# Patient Record
Sex: MALE | Race: WHITE | URBAN - METROPOLITAN AREA
[De-identification: names, ages, dates, MRNs, and addresses within clinical notes are randomized per-mention and may not be internally consistent; named-entity substitution may affect disease eponyms.]

---

## 2021-11-24 ENCOUNTER — EMERGENCY (EMERGENCY)
Facility: HOSPITAL | Age: 61
LOS: 1 days | End: 2021-11-24
Admitting: EMERGENCY MEDICINE
Payer: COMMERCIAL

## 2021-11-24 ENCOUNTER — INPATIENT (INPATIENT)
Facility: HOSPITAL | Age: 61
LOS: 2 days | Discharge: HOME | DRG: 175 | End: 2021-11-27
Attending: HOSPITALIST | Admitting: STUDENT IN AN ORGANIZED HEALTH CARE EDUCATION/TRAINING PROGRAM
Payer: COMMERCIAL

## 2021-11-24 VITALS
WEIGHT: 202.6 LBS | HEART RATE: 85 BPM | DIASTOLIC BLOOD PRESSURE: 83 MMHG | SYSTOLIC BLOOD PRESSURE: 128 MMHG | RESPIRATION RATE: 18 BRPM | OXYGEN SATURATION: 94 %

## 2021-11-24 DIAGNOSIS — I82.409 ACUTE EMBOLISM AND THROMBOSIS OF UNSPECIFIED DEEP VEINS OF UNSPECIFIED LOWER EXTREMITY: ICD-10-CM

## 2021-11-24 DIAGNOSIS — Z90.79 ACQUIRED ABSENCE OF OTHER GENITAL ORGAN(S): Chronic | ICD-10-CM

## 2021-11-24 DIAGNOSIS — Z90.79 ACQUIRED ABSENCE OF OTHER GENITAL ORGAN(S): ICD-10-CM

## 2021-11-24 DIAGNOSIS — I10 ESSENTIAL (PRIMARY) HYPERTENSION: ICD-10-CM

## 2021-11-24 DIAGNOSIS — I26.99 OTHER PULMONARY EMBOLISM WITHOUT ACUTE COR PULMONALE: ICD-10-CM

## 2021-11-24 LAB
ALBUMIN SERPL ELPH-MCNC: 3.3 G/DL — SIGNIFICANT CHANGE UP (ref 3.3–5.2)
ALP SERPL-CCNC: 82 U/L — SIGNIFICANT CHANGE UP (ref 40–120)
ALT FLD-CCNC: 16 U/L — SIGNIFICANT CHANGE UP
ANION GAP SERPL CALC-SCNC: 12 MMOL/L — SIGNIFICANT CHANGE UP (ref 5–17)
APTT BLD: 124.5 SEC — CRITICAL HIGH (ref 27.5–35.5)
AST SERPL-CCNC: 17 U/L — SIGNIFICANT CHANGE UP
BASOPHILS # BLD AUTO: 0.12 K/UL — SIGNIFICANT CHANGE UP (ref 0–0.2)
BASOPHILS NFR BLD AUTO: 1 % — SIGNIFICANT CHANGE UP (ref 0–2)
BILIRUB SERPL-MCNC: 0.8 MG/DL — SIGNIFICANT CHANGE UP (ref 0.4–2)
BLD GP AB SCN SERPL QL: SIGNIFICANT CHANGE UP
BUN SERPL-MCNC: 17.7 MG/DL — SIGNIFICANT CHANGE UP (ref 8–20)
CALCIUM SERPL-MCNC: 8.2 MG/DL — LOW (ref 8.6–10.2)
CHLORIDE SERPL-SCNC: 101 MMOL/L — SIGNIFICANT CHANGE UP (ref 98–107)
CO2 SERPL-SCNC: 23 MMOL/L — SIGNIFICANT CHANGE UP (ref 22–29)
CREAT SERPL-MCNC: 0.88 MG/DL — SIGNIFICANT CHANGE UP (ref 0.5–1.3)
EOSINOPHIL # BLD AUTO: 0.25 K/UL — SIGNIFICANT CHANGE UP (ref 0–0.5)
EOSINOPHIL NFR BLD AUTO: 2 % — SIGNIFICANT CHANGE UP (ref 0–6)
GLUCOSE SERPL-MCNC: 103 MG/DL — HIGH (ref 70–99)
HCT VFR BLD CALC: 32.4 % — LOW (ref 39–50)
HGB BLD-MCNC: 11 G/DL — LOW (ref 13–17)
IMM GRANULOCYTES NFR BLD AUTO: 0.2 % — SIGNIFICANT CHANGE UP (ref 0–1.5)
INR BLD: 1.38 RATIO — HIGH (ref 0.88–1.16)
LYMPHOCYTES # BLD AUTO: 1.87 K/UL — SIGNIFICANT CHANGE UP (ref 1–3.3)
LYMPHOCYTES # BLD AUTO: 14.9 % — SIGNIFICANT CHANGE UP (ref 13–44)
MCHC RBC-ENTMCNC: 30.2 PG — SIGNIFICANT CHANGE UP (ref 27–34)
MCHC RBC-ENTMCNC: 34 GM/DL — SIGNIFICANT CHANGE UP (ref 32–36)
MCV RBC AUTO: 89 FL — SIGNIFICANT CHANGE UP (ref 80–100)
MONOCYTES # BLD AUTO: 1.43 K/UL — HIGH (ref 0–0.9)
MONOCYTES NFR BLD AUTO: 11.4 % — SIGNIFICANT CHANGE UP (ref 2–14)
NEUTROPHILS # BLD AUTO: 8.84 K/UL — HIGH (ref 1.8–7.4)
NEUTROPHILS NFR BLD AUTO: 70.5 % — SIGNIFICANT CHANGE UP (ref 43–77)
NT-PROBNP SERPL-SCNC: 450 PG/ML — HIGH (ref 0–300)
PLATELET # BLD AUTO: 255 K/UL — SIGNIFICANT CHANGE UP (ref 150–400)
POTASSIUM SERPL-MCNC: 3.8 MMOL/L — SIGNIFICANT CHANGE UP (ref 3.5–5.3)
POTASSIUM SERPL-SCNC: 3.8 MMOL/L — SIGNIFICANT CHANGE UP (ref 3.5–5.3)
PROT SERPL-MCNC: 6.6 G/DL — SIGNIFICANT CHANGE UP (ref 6.6–8.7)
PROTHROM AB SERPL-ACNC: 15.8 SEC — HIGH (ref 10.6–13.6)
RBC # BLD: 3.64 M/UL — LOW (ref 4.2–5.8)
RBC # FLD: 13.1 % — SIGNIFICANT CHANGE UP (ref 10.3–14.5)
SODIUM SERPL-SCNC: 136 MMOL/L — SIGNIFICANT CHANGE UP (ref 135–145)
TROPONIN T SERPL-MCNC: 0.02 NG/ML — SIGNIFICANT CHANGE UP (ref 0–0.06)
WBC # BLD: 12.54 K/UL — HIGH (ref 3.8–10.5)
WBC # FLD AUTO: 12.54 K/UL — HIGH (ref 3.8–10.5)

## 2021-11-24 PROCEDURE — 93010 ELECTROCARDIOGRAM REPORT: CPT

## 2021-11-24 PROCEDURE — 99223 1ST HOSP IP/OBS HIGH 75: CPT

## 2021-11-24 PROCEDURE — 73562 X-RAY EXAM OF KNEE 3: CPT | Mod: 26,LT

## 2021-11-24 PROCEDURE — 99285 EMERGENCY DEPT VISIT HI MDM: CPT | Mod: 25

## 2021-11-24 PROCEDURE — 93971 EXTREMITY STUDY: CPT | Mod: 26,LT

## 2021-11-24 PROCEDURE — 99285 EMERGENCY DEPT VISIT HI MDM: CPT

## 2021-11-24 PROCEDURE — 71046 X-RAY EXAM CHEST 2 VIEWS: CPT | Mod: 26

## 2021-11-24 PROCEDURE — 93306 TTE W/DOPPLER COMPLETE: CPT | Mod: 26

## 2021-11-24 PROCEDURE — 71275 CT ANGIOGRAPHY CHEST: CPT | Mod: 26

## 2021-11-24 RX ORDER — HEPARIN SODIUM 5000 [USP'U]/ML
3500 INJECTION INTRAVENOUS; SUBCUTANEOUS EVERY 6 HOURS
Refills: 0 | Status: DISCONTINUED | OUTPATIENT
Start: 2021-11-24 | End: 2021-11-26

## 2021-11-24 RX ORDER — ONDANSETRON 8 MG/1
4 TABLET, FILM COATED ORAL EVERY 8 HOURS
Refills: 0 | Status: DISCONTINUED | OUTPATIENT
Start: 2021-11-24 | End: 2021-11-27

## 2021-11-24 RX ORDER — MORPHINE SULFATE 50 MG/1
4 CAPSULE, EXTENDED RELEASE ORAL ONCE
Refills: 0 | Status: DISCONTINUED | OUTPATIENT
Start: 2021-11-24 | End: 2021-11-24

## 2021-11-24 RX ORDER — SODIUM CHLORIDE 9 MG/ML
3 INJECTION INTRAMUSCULAR; INTRAVENOUS; SUBCUTANEOUS EVERY 8 HOURS
Refills: 0 | Status: DISCONTINUED | OUTPATIENT
Start: 2021-11-24 | End: 2021-11-27

## 2021-11-24 RX ORDER — ACETAMINOPHEN 500 MG
650 TABLET ORAL EVERY 6 HOURS
Refills: 0 | Status: DISCONTINUED | OUTPATIENT
Start: 2021-11-24 | End: 2021-11-27

## 2021-11-24 RX ORDER — MORPHINE SULFATE 50 MG/1
4 CAPSULE, EXTENDED RELEASE ORAL EVERY 4 HOURS
Refills: 0 | Status: DISCONTINUED | OUTPATIENT
Start: 2021-11-24 | End: 2021-11-25

## 2021-11-24 RX ORDER — HEPARIN SODIUM 5000 [USP'U]/ML
INJECTION INTRAVENOUS; SUBCUTANEOUS
Qty: 25000 | Refills: 0 | Status: DISCONTINUED | OUTPATIENT
Start: 2021-11-24 | End: 2021-11-26

## 2021-11-24 RX ORDER — HEPARIN SODIUM 5000 [USP'U]/ML
7500 INJECTION INTRAVENOUS; SUBCUTANEOUS EVERY 6 HOURS
Refills: 0 | Status: DISCONTINUED | OUTPATIENT
Start: 2021-11-24 | End: 2021-11-26

## 2021-11-24 RX ORDER — LANOLIN ALCOHOL/MO/W.PET/CERES
3 CREAM (GRAM) TOPICAL AT BEDTIME
Refills: 0 | Status: DISCONTINUED | OUTPATIENT
Start: 2021-11-24 | End: 2021-11-27

## 2021-11-24 RX ORDER — PANTOPRAZOLE SODIUM 20 MG/1
40 TABLET, DELAYED RELEASE ORAL
Refills: 0 | Status: DISCONTINUED | OUTPATIENT
Start: 2021-11-24 | End: 2021-11-27

## 2021-11-24 RX ADMIN — MORPHINE SULFATE 4 MILLIGRAM(S): 50 CAPSULE, EXTENDED RELEASE ORAL at 23:29

## 2021-11-24 RX ADMIN — MORPHINE SULFATE 4 MILLIGRAM(S): 50 CAPSULE, EXTENDED RELEASE ORAL at 22:21

## 2021-11-24 RX ADMIN — HEPARIN SODIUM 1500 UNIT(S)/HR: 5000 INJECTION INTRAVENOUS; SUBCUTANEOUS at 20:07

## 2021-11-24 RX ADMIN — SODIUM CHLORIDE 3 MILLILITER(S): 9 INJECTION INTRAMUSCULAR; INTRAVENOUS; SUBCUTANEOUS at 23:47

## 2021-11-24 RX ADMIN — HEPARIN SODIUM 1700 UNIT(S)/HR: 5000 INJECTION INTRAVENOUS; SUBCUTANEOUS at 19:00

## 2021-11-24 NOTE — CONSULT NOTE ADULT - PROBLEM SELECTOR RECOMMENDATION 9
Admit to Stepdown unit for close monitoring, check CBC, CMP, trend Trops, Coags, ECG  - patient on 3L NC w/ target sats > 92% no acute chest pain or dyspnea noted at rest  - hemodynamically stable w/ normotensive BP, normal right ventricular size and function and normal biomarkers  - empiric anticoagulation w/ Heparin gtt and keep PTT 60-90sec for large clot burden  - monitor for acute bleeding in light of recent prostate surgery, Galdamez cath in place no overt hematuria noted   - Stat ECHO done in [facility]  -  Thrombolysis and/or catheter-based therapies may be avoided at this time, risk of hemorrhage outweighs benefits due to recent surgery  - ICU consult requested by [facility]  - patient will require Covid Ab test, Hematology evaluation for fulminant malignancy  - bridging w/ DOACs as per primary team upon discharge

## 2021-11-24 NOTE — ED PROVIDER NOTE - NS ED ATTENDING STATEMENT MOD
[Person] : oriented to person [Place] : oriented to place [Time] : oriented to time [Cranial Nerves Optic (II)] : visual acuity intact bilaterally,  visual fields full to confrontation, pupils equal round and reactive to light [Cranial Nerves Oculomotor (III)] : extraocular motion intact [Cranial Nerves Trigeminal (V)] : facial sensation intact symmetrically [Cranial Nerves Facial (VII)] : face symmetrical [Cranial Nerves Vestibulocochlear (VIII)] : hearing was intact bilaterally [Cranial Nerves Glossopharyngeal (IX)] : tongue and palate midline [Cranial Nerves Accessory (XI - Cranial And Spinal)] : head turning and shoulder shrug symmetric [Cranial Nerves Hypoglossal (XII)] : there was no tongue deviation with protrusion [Toe-Walking] : normal toe-walking [Heel Walking] : normal heel walking [Tandem Walking] : normal tandem walking [Normal] : patient has a normal gait including toe-walking, heel-walking and tandem walking. Romberg sign is negative. [de-identified] : Fundi examination sharp margins bilaterally, no signs of papilledema [de-identified] : Patient had multiple episodes of blinking throughout the exam.  Attending with

## 2021-11-24 NOTE — ED ADULT NURSE NOTE - ED STAT RN HANDOFF DETAILS
Report given to EDWINA King holding room   Pt transported to bed CDU 4R, placed on CM and .  Oxygen 2lpm via NC.

## 2021-11-24 NOTE — CONSULT NOTE ADULT - PROBLEM SELECTOR RECOMMENDATION 3
Patient w/ indwelling Galdamez Cath, monitor urine output  - follow closely for acute hemorrhage on heparin  - pain management    case d/w Dr. Allen

## 2021-11-24 NOTE — ED PROVIDER NOTE - CLINICAL SUMMARY MEDICAL DECISION MAKING FREE TEXT BOX
60yo M w/pmh DVT, HTN, GERD presents as transfer from [facility] for DVT and PE. Heparin started at [facility], continued in ER. EKG, labs pending. 62yo M w/pmh DVT, HTN, GERD presents as transfer from [place] for DVT and PE. Heparin started at [facility], continued in ER. EKG, labs show elevated PTT, will adjust heparin per protocol. COVID negative at [facility].

## 2021-11-24 NOTE — ED PROVIDER NOTE - NS ED ROS FT
Constitutional: no fever, no sweats, and no chills.  CV: no chest pain, no edema.  Resp: no cough, +dyspnea on exertion  GI: no abdominal pain, no nausea and no vomiting.  MSK: +msk pain, no weakness  Skin: no lesions, and no rashes.  Neuro: no LOC, no headache, no sensory deficits, and no dizziness  ROS otherwise negative except as noted in HPI.

## 2021-11-24 NOTE — H&P ADULT - ASSESSMENT
62 y/o male with B/L PE, LLE DVT, s/p recent radical prostatectomy for prostate cancer, Hx of HTN, GERD    PE/DVT:  -Likely provoked from recent surgery and underlying malignancy   -RV strain on CT, currently normotensive, not hypoxic, trop neg. minor elevation in BNP, EKG as above  -Hold Norvasc for now due to risk of hypotension and risk of bleeding on heparin with recent surgery  -Cont. Heparin nomogram, monitor for hematuria, drop in Hbg.  -F/U on 2Decho, EKOS eval in AM with Cardiology  -Patient is high risk for bleeding in post operative bed with TPA.  -Eventual transition to NOAC prior to DC   -OOB, ambulate  -COVID PCR neg at [facility] today    Prostatectomy:  -Due for moreno removal on Friday, will need to reach out to [facility] team prior DC of moreno  -All incisions clean/intact  -F/U with MSK team post DC  -PRN pain regimen    HTN:  -DASH diet  -Resume Norvasc if no bleeding/remains normotensive    GERD:  -PPI    Discussed plan with Patient, ED staff.

## 2021-11-24 NOTE — ED ADULT TRIAGE NOTE - CHIEF COMPLAINT QUOTE
Pt BIBA from [facility], transferred for + DVT to RLE and Pulmonary emboli s/p prostatectomy 1 week ago. Pt arrived on Heparin gtt at 1650 units / hour. Per paperwork from [facility], last PTT completed at 1444. Pt brought directly to critical care room. MD called to bedside for eval.

## 2021-11-24 NOTE — H&P ADULT - HISTORY OF PRESENT ILLNESS
60 y/o male sent from [facility] after being diagnosed there with B/L PE with high clot burden and LLE DVT. Patient is s/p radical prostatectomy with lymph node dissection at [facility] last week for prostate cancer. He was dcd home with Moreno and was supposed to f/u this Friday with moreno removal. He states he has been fairly mobile at home, eating and with not much pain. He went to [facility] today as instructed by his Doctor who he called at [facility] after he noted SOB and cough which was preceded by left calf pain the day prior.  He does admit to a superficial thrombus 3 years ago after arthroscopic surgery to remove bone fragments in his left knee. Denies any other history of VTE. Denies fever, chills, N/V, CP. No bleeding reported in catheter and abdominal/pelvic incisions clean and intact. In the ED patient with no RV strain on EKG, neg trop, not hypoxic, and normotensive. Started on Heparin drip at [facility] and currently infusing.

## 2021-11-24 NOTE — ED PROVIDER NOTE - PROGRESS NOTE DETAILS
Resident Sofi Hamilton: echo performed, spoke to cardiology, they said they will post the read shortly. Patient remains stable. PTT elevated, heparin held per protocol, will recheck. Resident Sofi Hamilton: spoke to cardiology, plan for stat TTE to evaluate right heart strain seen on CT at [place]. Consulted MICU. Resident Sofi Hamilton: spoke to MICU, not a candidate for ICU at this time. Patient remains stable. Admitting to medicine.

## 2021-11-24 NOTE — CONSULT NOTE ADULT - SUBJECTIVE AND OBJECTIVE BOX
[facility]                                                        Office: [address] [address]                                                       Telephone: [phone]. Fax:[phone]                                                              CARDIOLOGY CONSULTATION NOTE                                                                                                 History obtained by: Patient and medical record     obtained: No    Chief complaint:       HPI: Patient is a 62yo M         REVIEW OF SYMPTOMS: Cardiovascular:  See HPI. No chest pain,  No dyspnea,  No syncope,  No palpitations, No dizziness, No Orthopnea,      No Paroxsymal nocturnal dyspnea;  Respiratory:  No Dyspnea, No cough,     Genitourinary:  No dysuria, no hematuria; Gastrointestinal:  No nausea, no vomiting. No diarrhea.  No abdominal pain. No dark color stool, no melena ; Neurological: No headache, no dizziness, no slurred speech;  Psychiatric: No agitation, no anxiety.  ALL OTHER REVIEW OF SYSTEMS ARE NEGATIVE.    ALLERGIES: No Known Allergies    CURRENT MEDICATIONS:  morphine  - Injectable  heparin  Infusion.    HOME MEDICATIONS:  Amlodipine 10mg oral daily    PAST MEDICAL HISTORY  Prostate cancer  GERD (gastroesophageal reflux disease)  Hypertension    PAST SURGICAL HISTORY  H/O prostatectomy    FAMILY HISTORY: CAD and Prostate CA    SOCIAL HISTORY:  Denies smoking/alcohol/drugs    Vital Signs Last 24 Hrs  T(C): 37.2 (24 Nov 2021 19:05), Max: 37.2 (24 Nov 2021 19:05)  T(F): 99 (24 Nov 2021 19:05), Max: 99 (24 Nov 2021 19:05)  HR: 94 (24 Nov 2021 19:17) (79 - 94)  BP: 128/83 (24 Nov 2021 19:17) (117/85 - 128/83)  BP(mean): --  RR: 20 (24 Nov 2021 19:17) (18 - 20)  SpO2: 97% (24 Nov 2021 19:17) (94% - 100%)    PHYSICAL EXAM:  Constitutional: Comfortable . No acute distress.   HEENT: Atraumatic and normcephalic , neck is supple . no JVD. No carotid bruit. PEERL   CNS: A&Ox3. No focal deficits. EOMI. Cranial nerves II-IX are intact.   Lymph Nodes: Cervical : Not palpable.  Respiratory: CTAB  Cardiovascular: S1S2 RRR. No murmur/rubs or gallop.  Gastrointestinal: Soft non-tender and non distended . +Bowel sounds. negative Serrano's sign.  Extremities: No edema.   Psychiatric: Calm . no agitation.  Skin: No skin rash/ulcers visualized to face, hands or feet.    Intake and output:     LABS:                        11.0   12.54 )-----------( 255      ( 24 Nov 2021 19:26 )             32.4     11-24    136  |  101  |  17.7  ----------------------------<  103<H>  3.8   |  23.0  |  0.88    Ca    8.2<L>      24 Nov 2021 19:26    TPro  6.6  /  Alb  3.3  /  TBili  0.8  /  DBili  x   /  AST  17  /  ALT  16  /  AlkPhos  82  11-24      ;p-BNP=  PT/INR - ( 24 Nov 2021 19:26 )   PT: 15.8 sec;   INR: 1.38 ratio         PTT - ( 24 Nov 2021 19:26 )  PTT:124.5 sec      INTERPRETATION OF TELEMETRY: Reviewed by me.   ECG: Reviewed by me.     RADIOLOGY & ADDITIONAL STUDIES:    X-ray:  reviewed by me.   CT scan:   MRI:   ECHO FINDINGS: Date:                : LVEF=          ; RV function:       ; Valvular abnormalities: No significant valvular abnormality.  Mitral valve:           ;  Aortic valve:              ;Tricuspid valve:         ; Pulmonary pressures:        mm Hg. Pericardium:      STRESS  FINDINGS: Date:            ;      CATHETERIZATION FINDINGS:  Date:              :  LAD:                       ;  LCx:                        ; RCA:                ;                                                                        [facility]                                                       [facility]                                                        Office: [address], [place] [address]                                                       Telephone: [phone]. Fax:[phone]                                                              CARDIOLOGY CONSULTATION NOTE                                                                                                 History obtained by: Patient and medical record     obtained: No    Chief complaint:   I developed Left knee pain and then was short of breath today    HPI: Patient is a 60yo M w/ PMHx of hypertension, hyperlipidemia, prostate cancer, s/p  radical prostatectomy this past Friday at [facility] in [place] and indwelling Galdamez, transferred from [facility] after patient presented w/ complaints of left lower extremity pain and shortness of breath with cough.  Patient states that yesterday afternoon he developed intermittent LLE calf pain, radiation to back of knee.  Pain was initially dull then turned into sharp pain and became worse.  Patient tried to walk it off, but overnight he couldn't rest and this morning developed dyspnea and cough.  Called [facility] and staff over there told him to go to closest ER for evaluation.  At [facility] patient underwent LLE US that confirmed acute Left peroneal vein thrombosis and complex popliteal cyst.  and subsequent CT Angio showed B/L PEs w/ possible right heart strain.  Patient transported to [facility] via ambulance.  Currently still with LLE pain and mild dyspnea.  Denies trauma, sick contacts, fever, chills, weakness, headache, recent travel, change in vision,  chest pain, abdominal pain, nausea, vomiting, or change in bowel movements.  Patient did not get any Covid vaccine.     REVIEW OF SYMPTOMS:   Cardiovascular: See HPI. No chest pain, + dyspnea, No syncope, no pleuritic chest pain, no palpitations, no dizziness, no Orthopnea, no Paroxsymal nocturnal dyspnea;    Respiratory: + Dyspnea, +cough,     Genitourinary:  + Galdamez cath, No dysuria, no hematuria;   Gastrointestinal: No nausea, no vomiting. No diarrhea. +mild post surgical abdominal pain, no dark color stool, no melena;   Neurological: No headache, no dizziness, no slurred speech;    Psychiatric: No agitation, no anxiety.  ALL OTHER REVIEW OF SYSTEMS ARE NEGATIVE.    ALLERGIES: No Known Allergies    CURRENT MEDICATIONS:  morphine  - Injectable  heparin  Infusion.    HOME MEDICATIONS:  Amlodipine 10mg oral daily    PAST MEDICAL HISTORY  Prostate cancer  GERD (gastroesophageal reflux disease)  Hypertension    PAST SURGICAL HISTORY  H/O prostatectomy    FAMILY HISTORY: CAD and Prostate CA    SOCIAL HISTORY:  Denies smoking/alcohol/drugs    Vital Signs Last 24 Hrs  T(C): 37.2 (24 Nov 2021 19:05), Max: 37.2 (24 Nov 2021 19:05)  T(F): 99 (24 Nov 2021 19:05), Max: 99 (24 Nov 2021 19:05)  HR: 94 (24 Nov 2021 19:17) (79 - 94)  BP: 128/83 (24 Nov 2021 19:17) (117/85 - 128/83)  BP(mean): --  RR: 20 (24 Nov 2021 19:17) (18 - 20)  SpO2: 97% (24 Nov 2021 19:17) (94% - 100%)    PHYSICAL EXAM:  Constitutional: Comfortable mild resp. distress on 3L NC   HEENT: Atraumatic, EOMi, neck is supple, no JVD, No carotid bruit   CNS: A&Ox3. No focal motor deficits, sensory intact   Respiratory: CTA no wheezing or rales,   Cardiovascular: S1S2 RRR. No murmur/rubs  Gastrointestinal: Soft, + tenderness in lower Qs, non distended +Bowel sounds  Extremities: + LLE calf tenderness to fine touch, mild non pitting edema    Psychiatric: Calm, no agitation  Skin: warm to touch, no skin rash/ulcers visualized to face, hands or feet    Intake and output:     LABS:                        11.0   12.54 )-----------( 255      ( 24 Nov 2021 19:26 )             32.4     11-24    136  |  101  |  17.7  ----------------------------<  103<H>  3.8   |  23.0  |  0.88    Ca    8.2<L>      24 Nov 2021 19:26    TPro  6.6  /  Alb  3.3  /  TBili  0.8  /  DBili  x   /  AST  17  /  ALT  16  /  AlkPhos  82  11-24    PT: 15.8 sec;   INR: 1.38 ratio   PTT:124.5 sec    INTERPRETATION OF TELEMETRY: Sinus no ectopy  ECG: NSR@ 88bpm, no acute T or ST changes     RADIOLOGY & ADDITIONAL STUDIES:   [facility] CT Angio scan:  IMPRESSION: Acute bilateral pulmonary emboli ,with a high clot burden, evidence of right ventricular strain. No pulmonary infarct.    Right LE US:  IMPRESSION: Left peroneal vein thrombosis. Complex popliteal cyst. Acute deep venous thrombosis: below the knee.    ECHO FINDINGS: Date: Pen                                                                       [facility]                                                       [facility]                                                        Office: [address], [place] [address]                                                       Telephone: [phone]. Fax:[phone]                                                              CARDIOLOGY CONSULTATION NOTE                                                                                                 History obtained by: Patient and medical record     obtained: No    Chief complaint:   I developed Left knee pain and then was short of breath today    HPI: Patient is a 60yo M w/ PMHx of hypertension, hyperlipidemia, prostate cancer, s/p  radical prostatectomy this past Friday at [facility] in [place] and indwelling Galdamez, transferred from [facility] after patient presented w/ complaints of left lower extremity pain and shortness of breath with cough.  Patient states that yesterday afternoon he developed intermittent LLE calf pain, radiation to back of knee.  Pain was initially dull then turned into sharp pain and became worse.  Patient tried to walk it off, but overnight he couldn't rest and this morning developed dyspnea and cough.  Called [facility] and staff over there told him to go to closest ER for evaluation.  At [facility] patient underwent LLE US that confirmed acute Left peroneal vein thrombosis and complex popliteal cyst.  and subsequent CT Angio showed B/L PEs w/ possible right heart strain.  Patient transported to [facility] via ambulance.  Currently still with LLE pain and mild dyspnea.  Denies trauma, sick contacts, fever, chills, weakness, headache, recent travel, change in vision,  chest pain, abdominal pain, nausea, vomiting, or change in bowel movements.  Patient did not get any Covid vaccine.     REVIEW OF SYMPTOMS:   Cardiovascular: See HPI. No chest pain, + dyspnea, No syncope, no pleuritic chest pain, no palpitations, no dizziness, no Orthopnea, no Paroxsymal nocturnal dyspnea;    Respiratory: + Dyspnea, +cough,     Genitourinary:  + Galdamez cath, No dysuria, no hematuria;   Gastrointestinal: No nausea, no vomiting. No diarrhea. +mild post surgical abdominal pain, no dark color stool, no melena;   Neurological: No headache, no dizziness, no slurred speech;    Psychiatric: No agitation, no anxiety.  ALL OTHER REVIEW OF SYSTEMS ARE NEGATIVE.    ALLERGIES: No Known Allergies    CURRENT MEDICATIONS:  morphine  - Injectable  heparin  Infusion.    HOME MEDICATIONS:  Amlodipine 10mg oral daily    PAST MEDICAL HISTORY  Prostate cancer  GERD (gastroesophageal reflux disease)  Hypertension    PAST SURGICAL HISTORY  H/O prostatectomy    FAMILY HISTORY: CAD and Prostate CA    SOCIAL HISTORY:  Denies smoking/alcohol/drugs    Vital Signs Last 24 Hrs  T(C): 37.2 (24 Nov 2021 19:05), Max: 37.2 (24 Nov 2021 19:05)  T(F): 99 (24 Nov 2021 19:05), Max: 99 (24 Nov 2021 19:05)  HR: 94 (24 Nov 2021 19:17) (79 - 94)  BP: 128/83 (24 Nov 2021 19:17) (117/85 - 128/83)  BP(mean): --  RR: 20 (24 Nov 2021 19:17) (18 - 20)  SpO2: 97% (24 Nov 2021 19:17) (94% - 100%)    PHYSICAL EXAM:  Constitutional: Comfortable mild resp. distress on 3L NC   HEENT: Atraumatic, EOMi, neck is supple, no JVD, No carotid bruit   CNS: A&Ox3. No focal motor deficits, sensory intact   Respiratory: CTA no wheezing or rales,   Cardiovascular: S1S2 RRR. No murmur/rubs  Gastrointestinal: Soft, + tenderness in lower Qs, non distended +Bowel sounds  Extremities: + LLE calf tenderness to fine touch, mild non pitting edema    Psychiatric: Calm, no agitation  Skin: warm to touch, no skin rash/ulcers visualized to face, hands or feet    Intake and output:     LABS:                        11.0   12.54 )-----------( 255      ( 24 Nov 2021 19:26 )             32.4     11-24    136  |  101  |  17.7  ----------------------------<  103<H>  3.8   |  23.0  |  0.88    Ca    8.2<L>      24 Nov 2021 19:26    TPro  6.6  /  Alb  3.3  /  TBili  0.8  /  DBili  x   /  AST  17  /  ALT  16  /  AlkPhos  82  11-24    PT: 15.8 sec;   INR: 1.38 ratio   PTT:124.5 sec    INTERPRETATION OF TELEMETRY: Sinus no ectopy  ECG: NSR@ 88bpm, no acute T or ST changes     RADIOLOGY & ADDITIONAL STUDIES:   [facility] CT Angio scan:  IMPRESSION: Acute bilateral pulmonary emboli ,with a high clot burden, evidence of right ventricular strain. No pulmonary infarct.    Right LE US:  IMPRESSION: Left peroneal vein thrombosis. Complex popliteal cyst. Acute deep venous thrombosis: below the knee.    ECHO FINDINGS: Date: Summary:   1. Left ventricular ejection fraction, by visual estimation, is 70 to 75%.   2. Hyperdynamic global left ventricular systolic function.   3. There is mild concentric left ventricular hypertrophy.   4. Mildly enlarged right ventricle.   5. Moderately reduced RV systolic function.   6. Mild mitral valve regurgitation.   7. Mild tricuspid regurgitation.   8. Estimated pulmonary artery systolic pressure is 46.0 mmHg assuming a right atrial pressure of 3 mmHg, which is consistent with mild pulmonary hypertension.   9. Dilatation of the aortic root and sinuses of Valsalva.  10. Results d/w the ordering provider.    MD Nikki Electronically signed on 11/24/2021 at 11:34:56 PM

## 2021-11-24 NOTE — ED ADULT NURSE NOTE - NSICDXPASTMEDICALHX_GEN_ALL_CORE_FT
PAST MEDICAL HISTORY:  GERD (gastroesophageal reflux disease)     Hypertension     Prostate cancer

## 2021-11-24 NOTE — CONSULT NOTE ADULT - PROBLEM SELECTOR RECOMMENDATION 2
Low na diet, monitor on tele, optimal BP goals  - resume Norvasc 10mg oral daily  - PT evaluation and OOB as taurus Low na diet, monitor on tele, optimal BP goals  - hold Norvasc for now  - PT evaluation and OOB as taurus

## 2021-11-24 NOTE — ED PROVIDER NOTE - OBJECTIVE STATEMENT
60yo M w/pmh DVT, HTN, GERD presents as transfer from [place] for DVT and PE. Reports L calf pain since yesterday, CRAIN today. Went to [facility] [place] for evaluation, found to have bilateral PE w/ evidence of R heart strain, LLE DVT. Started on heparin, transferred. Not currently SOB. Denies lightheadedness, CP. 60yo M w/pmh DVT, HTN, GERD, prostectomy 5d ago presents as transfer from [place] for DVT and PE. Reports L calf pain since yesterday, CRAIN today. Went to [place] for evaluation, found to have bilateral PE w/ evidence of R heart strain, LLE DVT. Started on heparin, transferred. Not currently SOB. Denies lightheadedness, CP.

## 2021-11-24 NOTE — ED PROVIDER NOTE - ATTENDING CONTRIBUTION TO CARE
60yo M w/pmh DVT, HTN, GERD presents as transfer from [facility] for DVT and PE. Heparin started at [facility], continued in ER. EKG, labs show elevated PTT, will adjust heparin per protocol. COVID negative at [facility].

## 2021-11-24 NOTE — CONSULT NOTE ADULT - SUBJECTIVE AND OBJECTIVE BOX
Patient is a 61y old  Male who presents with a chief complaint of     HPI:  61M w/ PMHx HTN, prostate CA s/p recent radical prostatectomy (11/09) at [facility] w/ indwelling moreno was sent over form [facility] after being diagnosed to have a PE. Pt started experiencing worsening LLE pain yesterday in addition to exertion SOB and fatigue. Found to have a LLE acute DVT and CTA w/ B/L PEs w/ extensive clot burden. Sent to [facility] for further management.   On ICU eval he was asymptomatic and hemodynamically stable. Moreno w/ clear urine. On hep gtt     PAST MEDICAL & SURGICAL HISTORY:  Prostate cancer    GERD (gastroesophageal reflux disease)    Hypertension    H/O prostatectomy      Review of Systems:  CONSTITUTIONAL: No fever, chills, or fatigue  EYES: No eye pain, visual disturbances, or discharge  ENMT:  No difficulty hearing, tinnitus, vertigo; No sinus or throat pain  NECK: No pain or stiffness  RESPIRATORY: No cough, wheezing, chills or hemoptysis; No shortness of breath  CARDIOVASCULAR: No chest pain, palpitations, dizziness, or leg swelling  GASTROINTESTINAL: No abdominal or epigastric pain. No nausea, vomiting, or hematemesis; No diarrhea or constipation. No melena or hematochezia.  GENITOURINARY: No dysuria, frequency, hematuria, or incontinence  NEUROLOGICAL: No headaches, memory loss, loss of strength, numbness, or tremors  SKIN: No itching, burning, rashes, or lesions   MUSCULOSKELETAL: No joint pain or swelling; No muscle, back. +ve for extremity pain  PSYCHIATRIC: No depression, anxiety, mood swings, or difficulty sleeping      Physical Examination:    ICU Vital Signs Last 24 Hrs  T(C): 37.2 (24 Nov 2021 19:05), Max: 37.2 (24 Nov 2021 19:05)  T(F): 99 (24 Nov 2021 19:05), Max: 99 (24 Nov 2021 19:05)  HR: 86 (24 Nov 2021 22:21) (79 - 94)  BP: 137/79 (24 Nov 2021 22:21) (117/85 - 137/79)  BP(mean): --  ABP: --  ABP(mean): --  RR: 20 (24 Nov 2021 22:21) (18 - 20)  SpO2: 98% (24 Nov 2021 22:21) (94% - 100%)      General: No acute distress.    Neuro: AAO*3, No focal deficits   HEENT: Pupils equal, reactive to light  PULM: Clear to auscultation bilaterally  CVS: Regular rhythm and controlled rate  ABD: Soft, nondistended, nontender  EXT: No b/l LE edema  SKIN: Warm and well perfused, no acute rashes       Medications:        acetaminophen     Tablet .. 650 milliGRAM(s) Oral every 6 hours PRN  melatonin 3 milliGRAM(s) Oral at bedtime PRN  ondansetron Injectable 4 milliGRAM(s) IV Push every 8 hours PRN      heparin   Injectable 7500 Unit(s) IV Push every 6 hours PRN  heparin   Injectable 3500 Unit(s) IV Push every 6 hours PRN  heparin  Infusion.  Unit(s)/Hr IV Continuous <Continuous>    aluminum hydroxide/magnesium hydroxide/simethicone Suspension 30 milliLiter(s) Oral every 4 hours PRN        sodium chloride 0.9% lock flush 3 milliLiter(s) IV Push every 8 hours                I&O's Detail        RADIOLOGY/ Microbiology/ Labs: reviewed

## 2021-11-24 NOTE — CONSULT NOTE ADULT - ATTENDING COMMENTS
Patient with DVT and Pulmonary embolism. Patient c/o left knee pain and swelling.  < from: TTE Echo Complete w/o Contrast w/ Doppler (11.24.21 @ 20:12) >    Summary:   1. Left ventricular ejection fraction, by visual estimation, is 70 to 75%.   2. Hyperdynamic global left ventricular systolic function.   3. There is mild concentric left ventricular hypertrophy.   4. Mildly enlarged right ventricle.   5. Moderately reduced RV systolic function.   6. Mild mitral valve regurgitation.   7. Mild tricuspid regurgitation.   8. Estimated pulmonary artery systolic pressure is 46.0 mmHg assuming a right atrial pressure of 3 mmHg, which is consistent with mild pulmonary hypertension.   9. Dilatation of the aortic root and sinuses of Valsalva.  10. Results d/w the ordering provider.    MD Nikki Electronically signed on 11/24/2021 at 11:34:56 PM        Patient is on  IV Heparin for PE.  Patients echo shows RV Strain.    Patient to be evaluated by PERT team in AM for possible intervention for PE.

## 2021-11-24 NOTE — ED ADULT NURSE NOTE - OBJECTIVE STATEMENT
assumed pt care as critical care RN.  Transferred from [facility] for PE.  Pt had prostectomy less than a week ago.  Hx of DVT post knee surgery in 2019.  Symptoms began with left calf pain.  today he felt short of breath after walking up the stairs. No respiratory distress upon arrival.  Heparin gtt infusing well.  Moreno catheter in place draining to leg bag. Clear yellow urine in bag.  Changed to moreno bag. Awaiting new  heparin orders from ER MD. Pt weighed upon arrival.

## 2021-11-25 DIAGNOSIS — M25.462 EFFUSION, LEFT KNEE: ICD-10-CM

## 2021-11-25 DIAGNOSIS — I26.99 OTHER PULMONARY EMBOLISM WITHOUT ACUTE COR PULMONALE: ICD-10-CM

## 2021-11-25 DIAGNOSIS — Z02.9 ENCOUNTER FOR ADMINISTRATIVE EXAMINATIONS, UNSPECIFIED: ICD-10-CM

## 2021-11-25 LAB
ANION GAP SERPL CALC-SCNC: 12 MMOL/L — SIGNIFICANT CHANGE UP (ref 5–17)
APTT BLD: 36.9 SEC — HIGH (ref 27.5–35.5)
APTT BLD: 55.1 SEC — HIGH (ref 27.5–35.5)
APTT BLD: 64.1 SEC — HIGH (ref 27.5–35.5)
APTT BLD: 66.5 SEC — HIGH (ref 27.5–35.5)
B PERT IGG+IGM PNL SER: ABNORMAL
BUN SERPL-MCNC: 20.9 MG/DL — HIGH (ref 8–20)
CALCIUM SERPL-MCNC: 8.2 MG/DL — LOW (ref 8.6–10.2)
CHLORIDE SERPL-SCNC: 100 MMOL/L — SIGNIFICANT CHANGE UP (ref 98–107)
CO2 SERPL-SCNC: 24 MMOL/L — SIGNIFICANT CHANGE UP (ref 22–29)
COLOR FLD: YELLOW
CREAT SERPL-MCNC: 0.85 MG/DL — SIGNIFICANT CHANGE UP (ref 0.5–1.3)
CRP SERPL-MCNC: 66 MG/L — HIGH
ERYTHROCYTE [SEDIMENTATION RATE] IN BLOOD: 30 MM/HR — HIGH (ref 0–20)
FLUID INTAKE SUBSTANCE CLASS: SIGNIFICANT CHANGE UP
FLUID SEGMENTED GRANULOCYTES: 96 % — SIGNIFICANT CHANGE UP
GLUCOSE SERPL-MCNC: 123 MG/DL — HIGH (ref 70–99)
HCT VFR BLD CALC: 30.7 % — LOW (ref 39–50)
HCT VFR BLD CALC: 31.5 % — LOW (ref 39–50)
HCV AB S/CO SERPL IA: 0.2 S/CO — SIGNIFICANT CHANGE UP (ref 0–0.99)
HCV AB SERPL-IMP: SIGNIFICANT CHANGE UP
HGB BLD-MCNC: 10.3 G/DL — LOW (ref 13–17)
HGB BLD-MCNC: 10.6 G/DL — LOW (ref 13–17)
INR BLD: 1.29 RATIO — HIGH (ref 0.88–1.16)
LYMPHOCYTES # FLD: 1 % — SIGNIFICANT CHANGE UP
MAGNESIUM SERPL-MCNC: 2.1 MG/DL — SIGNIFICANT CHANGE UP (ref 1.6–2.6)
MCHC RBC-ENTMCNC: 30.2 PG — SIGNIFICANT CHANGE UP (ref 27–34)
MCHC RBC-ENTMCNC: 30.9 PG — SIGNIFICANT CHANGE UP (ref 27–34)
MCHC RBC-ENTMCNC: 33.6 GM/DL — SIGNIFICANT CHANGE UP (ref 32–36)
MCHC RBC-ENTMCNC: 33.7 GM/DL — SIGNIFICANT CHANGE UP (ref 32–36)
MCV RBC AUTO: 90 FL — SIGNIFICANT CHANGE UP (ref 80–100)
MCV RBC AUTO: 91.8 FL — SIGNIFICANT CHANGE UP (ref 80–100)
MONOS+MACROS # FLD: 3 % — SIGNIFICANT CHANGE UP
PHOSPHATE SERPL-MCNC: 3.7 MG/DL — SIGNIFICANT CHANGE UP (ref 2.4–4.7)
PLATELET # BLD AUTO: 228 K/UL — SIGNIFICANT CHANGE UP (ref 150–400)
PLATELET # BLD AUTO: 229 K/UL — SIGNIFICANT CHANGE UP (ref 150–400)
POTASSIUM SERPL-MCNC: 3.9 MMOL/L — SIGNIFICANT CHANGE UP (ref 3.5–5.3)
POTASSIUM SERPL-SCNC: 3.9 MMOL/L — SIGNIFICANT CHANGE UP (ref 3.5–5.3)
PROTHROM AB SERPL-ACNC: 14.8 SEC — HIGH (ref 10.6–13.6)
RBC # BLD: 3.41 M/UL — LOW (ref 4.2–5.8)
RBC # BLD: 3.43 M/UL — LOW (ref 4.2–5.8)
RBC # FLD: 13.3 % — SIGNIFICANT CHANGE UP (ref 10.3–14.5)
RBC # FLD: 13.4 % — SIGNIFICANT CHANGE UP (ref 10.3–14.5)
RCV VOL RI: <1000 /UL — HIGH (ref 0–0)
SARS-COV-2 RNA SPEC QL NAA+PROBE: SIGNIFICANT CHANGE UP
SODIUM SERPL-SCNC: 136 MMOL/L — SIGNIFICANT CHANGE UP (ref 135–145)
SYNOVIAL CRYSTALS CLARITY: ABNORMAL
SYNOVIAL CRYSTALS COLOR: YELLOW
SYNOVIAL CRYSTALS ID: SIGNIFICANT CHANGE UP
SYNOVIAL CRYSTALS TUBE: SIGNIFICANT CHANGE UP
TOTAL NUCLEATED CELL COUNT, BODY FLUID: SIGNIFICANT CHANGE UP /UL
TUBE TYPE: SIGNIFICANT CHANGE UP
WBC # BLD: 10.6 K/UL — HIGH (ref 3.8–10.5)
WBC # BLD: 10.84 K/UL — HIGH (ref 3.8–10.5)
WBC # FLD AUTO: 10.6 K/UL — HIGH (ref 3.8–10.5)
WBC # FLD AUTO: 10.84 K/UL — HIGH (ref 3.8–10.5)

## 2021-11-25 PROCEDURE — 99232 SBSQ HOSP IP/OBS MODERATE 35: CPT

## 2021-11-25 PROCEDURE — 73562 X-RAY EXAM OF KNEE 3: CPT | Mod: 26,LT

## 2021-11-25 PROCEDURE — 99233 SBSQ HOSP IP/OBS HIGH 50: CPT

## 2021-11-25 RX ORDER — HYDROMORPHONE HYDROCHLORIDE 2 MG/ML
0.5 INJECTION INTRAMUSCULAR; INTRAVENOUS; SUBCUTANEOUS ONCE
Refills: 0 | Status: DISCONTINUED | OUTPATIENT
Start: 2021-11-25 | End: 2021-11-25

## 2021-11-25 RX ORDER — ACETAMINOPHEN 500 MG
1000 TABLET ORAL ONCE
Refills: 0 | Status: COMPLETED | OUTPATIENT
Start: 2021-11-25 | End: 2021-11-25

## 2021-11-25 RX ORDER — KETOROLAC TROMETHAMINE 30 MG/ML
15 SYRINGE (ML) INJECTION EVERY 6 HOURS
Refills: 0 | Status: DISCONTINUED | OUTPATIENT
Start: 2021-11-25 | End: 2021-11-27

## 2021-11-25 RX ORDER — HYDROMORPHONE HYDROCHLORIDE 2 MG/ML
1 INJECTION INTRAMUSCULAR; INTRAVENOUS; SUBCUTANEOUS EVERY 6 HOURS
Refills: 0 | Status: DISCONTINUED | OUTPATIENT
Start: 2021-11-25 | End: 2021-11-25

## 2021-11-25 RX ORDER — HYDROMORPHONE HYDROCHLORIDE 2 MG/ML
0.5 INJECTION INTRAMUSCULAR; INTRAVENOUS; SUBCUTANEOUS EVERY 6 HOURS
Refills: 0 | Status: DISCONTINUED | OUTPATIENT
Start: 2021-11-25 | End: 2021-11-25

## 2021-11-25 RX ORDER — HYDROMORPHONE HYDROCHLORIDE 2 MG/ML
0.5 INJECTION INTRAMUSCULAR; INTRAVENOUS; SUBCUTANEOUS EVERY 4 HOURS
Refills: 0 | Status: DISCONTINUED | OUTPATIENT
Start: 2021-11-25 | End: 2021-11-25

## 2021-11-25 RX ORDER — HYDROMORPHONE HYDROCHLORIDE 2 MG/ML
1 INJECTION INTRAMUSCULAR; INTRAVENOUS; SUBCUTANEOUS EVERY 4 HOURS
Refills: 0 | Status: DISCONTINUED | OUTPATIENT
Start: 2021-11-25 | End: 2021-11-25

## 2021-11-25 RX ORDER — INFLUENZA VIRUS VACCINE 15; 15; 15; 15 UG/.5ML; UG/.5ML; UG/.5ML; UG/.5ML
0.5 SUSPENSION INTRAMUSCULAR ONCE
Refills: 0 | Status: DISCONTINUED | OUTPATIENT
Start: 2021-11-25 | End: 2021-11-27

## 2021-11-25 RX ADMIN — HEPARIN SODIUM 1700 UNIT(S)/HR: 5000 INJECTION INTRAVENOUS; SUBCUTANEOUS at 10:10

## 2021-11-25 RX ADMIN — MORPHINE SULFATE 4 MILLIGRAM(S): 50 CAPSULE, EXTENDED RELEASE ORAL at 08:50

## 2021-11-25 RX ADMIN — MORPHINE SULFATE 4 MILLIGRAM(S): 50 CAPSULE, EXTENDED RELEASE ORAL at 04:00

## 2021-11-25 RX ADMIN — HYDROMORPHONE HYDROCHLORIDE 0.5 MILLIGRAM(S): 2 INJECTION INTRAMUSCULAR; INTRAVENOUS; SUBCUTANEOUS at 14:34

## 2021-11-25 RX ADMIN — Medication 400 MILLIGRAM(S): at 12:04

## 2021-11-25 RX ADMIN — HEPARIN SODIUM 3500 UNIT(S): 5000 INJECTION INTRAVENOUS; SUBCUTANEOUS at 10:17

## 2021-11-25 RX ADMIN — SODIUM CHLORIDE 3 MILLILITER(S): 9 INJECTION INTRAMUSCULAR; INTRAVENOUS; SUBCUTANEOUS at 22:24

## 2021-11-25 RX ADMIN — PANTOPRAZOLE SODIUM 40 MILLIGRAM(S): 20 TABLET, DELAYED RELEASE ORAL at 07:11

## 2021-11-25 RX ADMIN — HEPARIN SODIUM 2100 UNIT(S)/HR: 5000 INJECTION INTRAVENOUS; SUBCUTANEOUS at 17:35

## 2021-11-25 RX ADMIN — MORPHINE SULFATE 4 MILLIGRAM(S): 50 CAPSULE, EXTENDED RELEASE ORAL at 03:05

## 2021-11-25 RX ADMIN — Medication 400 MILLIGRAM(S): at 05:13

## 2021-11-25 RX ADMIN — SODIUM CHLORIDE 3 MILLILITER(S): 9 INJECTION INTRAMUSCULAR; INTRAVENOUS; SUBCUTANEOUS at 07:11

## 2021-11-25 RX ADMIN — SODIUM CHLORIDE 3 MILLILITER(S): 9 INJECTION INTRAMUSCULAR; INTRAVENOUS; SUBCUTANEOUS at 15:14

## 2021-11-25 RX ADMIN — HEPARIN SODIUM 7500 UNIT(S): 5000 INJECTION INTRAVENOUS; SUBCUTANEOUS at 17:37

## 2021-11-25 NOTE — CONSULT NOTE ADULT - SUBJECTIVE AND OBJECTIVE BOX
Pt Name: UMU BONILLA    MRN: [id number]      Patient is a 61y Male presenting to the emergency department with a chief complaint of  left leg pain. Patient states he began having left lower leg and knee pain on tuesday night. It became unbearable and he was having extreme difficulty ambulating. Patient had radical prostatectomy 6 days ago at [facility]. Currently has moreno catheter. Was not on any anticoagulation post operatively. States the pain in his left calf and knee continues to worsen.       HEALTH ISSUES - PROBLEM Dx:  Deep vein thrombosis (DVT) with pulmonary embolism present on admission    HTN, goal below 130/80    S/P prostatectomy    Pulmonary thromboembolism    Effusion of knee joint, left    Discharge planning issues        .      REVIEW OF SYSTEMS      General: Remains SOB	    Musculoskeletal:	 see HPI- left leg pain     ROS is otherwise negative.      PAST MEDICAL & SURGICAL HISTORY:  Prostate cancer    GERD (gastroesophageal reflux disease)    Hypertension    H/O prostatectomy        Allergies: No Known Allergies      Medications: acetaminophen     Tablet .. 650 milliGRAM(s) Oral every 6 hours PRN  aluminum hydroxide/magnesium hydroxide/simethicone Suspension 30 milliLiter(s) Oral every 4 hours PRN  heparin   Injectable 7500 Unit(s) IV Push every 6 hours PRN  heparin   Injectable 3500 Unit(s) IV Push every 6 hours PRN  heparin  Infusion.  Unit(s)/Hr IV Continuous <Continuous>  HYDROmorphone  Injectable 1 milliGRAM(s) IV Push every 6 hours PRN  HYDROmorphone  Injectable 0.5 milliGRAM(s) IV Push every 6 hours PRN  influenza   Vaccine 0.5 milliLiter(s) IntraMuscular once  melatonin 3 milliGRAM(s) Oral at bedtime PRN  ondansetron Injectable 4 milliGRAM(s) IV Push every 8 hours PRN  pantoprazole    Tablet 40 milliGRAM(s) Oral before breakfast  sodium chloride 0.9% lock flush 3 milliLiter(s) IV Push every 8 hours      FAMILY HISTORY:  FH: CAD (coronary artery disease)    : non-contributory    Social History:     Ambulation: Walking independently [ ] With Cane [ ] With Walker [ ]  Bedbound [ ]                           10.3   10.60 )-----------( 228      ( 25 Nov 2021 01:58 )             30.7     11-25    136  |  100  |  20.9<H>  ----------------------------<  123<H>  3.9   |  24.0  |  0.85    Ca    8.2<L>      25 Nov 2021 01:58  Phos  3.7     11-25  Mg     2.1     11-25    TPro  6.6  /  Alb  3.3  /  TBili  0.8  /  DBili  x   /  AST  17  /  ALT  16  /  AlkPhos  82  11-24      PHYSICAL EXAM:    Vital Signs Last 24 Hrs  T(C): 37.1 (25 Nov 2021 11:27), Max: 37.7 (24 Nov 2021 23:31)  T(F): 98.8 (25 Nov 2021 11:27), Max: 99.9 (24 Nov 2021 23:31)  HR: 79 (25 Nov 2021 11:27) (74 - 94)  BP: 135/86 (25 Nov 2021 11:27) (108/63 - 137/79)  BP(mean): 97 (24 Nov 2021 23:16) (97 - 97)  RR: 20 (25 Nov 2021 11:27) (18 - 20)  SpO2: 95% (25 Nov 2021 11:27) (94% - 100%)  Daily     Daily     Appearance: Alert, responsive, in no acute distress.  Neurological: Sensation is grossly intact to light touch. 5/5 motor function of all extremities. No focal deficits or weaknesses found.  Skin: no rash on visible skin. Skin is clean, dry and intact. No bleeding. No abrasions. No ulcerations.  Musculoskeletal: Left Lower Extremity: Calf painful to palpation. Knee has effusion noted and ROM limited 0-40 with discomfort. ROM toes. Pain with dorsi flexion         Imaging Studies:  EXAM:  XR KNEE 3 VIEWS LT                          PROCEDURE DATE:  11/25/2021          INTERPRETATION:  Clinical history: 61-year-old male, worsening pain.    Three views of the left knee without comparison demonstrate moderate hypertrophic/degenerative OA with no fracture or dislocation.    A small to moderate-sized suprapatellar effusion is noted.    IMPRESSION:  Moderate OA and small to moderate-sized suprapatellar effusion. If there is continued clinical concern follow-up MRI can be ordered    --- End of Report ---            MEGHAN KERN DO; Attending Radiologist  This document has been electronically signed. Nov 25 2021 12:49PM    ARTHROCENTSIS  PROCEDURE NOTE: Arthrocentesis    Performed by: Michaela Silva PA-C    Indication: Effusion / rule out septic joint    Consent: the risks and benefits of arthrocentesis discussed with patient, including the risk of bleeding, infection, and technical failure. The risks of not performing the procedure, failure to diagnose septic joint with resultant systemic infection, discussed with the patient. The alternatives of performing the procedure also discussed. Verbal consent was obtained following the discussion.    Universal Protocol: A time out was performed and the correct patient and site were verified.    The left knee joint was prepped and draped in proper sterile fashion. An 18 gauge needle was used to aspirate fluid from the joint using appropriate anatomic landmarks. Yellow straw colored tubid fluid was obtained from the joint. Approximately 85 milliliters of fluid was obtained. The fluid was then sent to the lab for appropriate studies. The site was bandaged and no complications were noted. The patient tolerated the procedure well.    Post-Procedure Diagnosis: Effusion  Complications: None  Specimens Removed: fluid only    A/P:  Pt is a  61y Male with left knee effusion, left lower leg DVT and B/L PE     PLAN:   * Left knee fluid sent to lab for cell count, crystals, cultures and gram stain   * Definitive plan to be determined once labs return   * Case discussed with Dr Martinez and medical team Pt Name: UMU BONILLA    MRN: [id number]      Patient is a 61y Male presenting to the emergency department with a chief complaint of  left leg pain. Patient states he began having left lower leg and knee pain on tuesday night. It became unbearable and he was having extreme difficulty ambulating. Patient had radical prostatectomy 6 days ago at [facility]. Currently has moreno catheter. Was not on any anticoagulation post operatively. States the pain in his left calf and knee continues to worsen.       HEALTH ISSUES - PROBLEM Dx:  Deep vein thrombosis (DVT) with pulmonary embolism present on admission    HTN, goal below 130/80    S/P prostatectomy    Pulmonary thromboembolism    Effusion of knee joint, left    Discharge planning issues        .      REVIEW OF SYSTEMS      General: Remains SOB	    Musculoskeletal:	 see HPI- left leg pain     ROS is otherwise negative.      PAST MEDICAL & SURGICAL HISTORY:  Prostate cancer    GERD (gastroesophageal reflux disease)    Hypertension    H/O prostatectomy        Allergies: No Known Allergies      Medications: acetaminophen     Tablet .. 650 milliGRAM(s) Oral every 6 hours PRN  aluminum hydroxide/magnesium hydroxide/simethicone Suspension 30 milliLiter(s) Oral every 4 hours PRN  heparin   Injectable 7500 Unit(s) IV Push every 6 hours PRN  heparin   Injectable 3500 Unit(s) IV Push every 6 hours PRN  heparin  Infusion.  Unit(s)/Hr IV Continuous <Continuous>  HYDROmorphone  Injectable 1 milliGRAM(s) IV Push every 6 hours PRN  HYDROmorphone  Injectable 0.5 milliGRAM(s) IV Push every 6 hours PRN  influenza   Vaccine 0.5 milliLiter(s) IntraMuscular once  melatonin 3 milliGRAM(s) Oral at bedtime PRN  ondansetron Injectable 4 milliGRAM(s) IV Push every 8 hours PRN  pantoprazole    Tablet 40 milliGRAM(s) Oral before breakfast  sodium chloride 0.9% lock flush 3 milliLiter(s) IV Push every 8 hours      FAMILY HISTORY:  FH: CAD (coronary artery disease)    : non-contributory    Social History:     Ambulation: Walking independently [ ] With Cane [ ] With Walker [ ]  Bedbound [ ]                           10.3   10.60 )-----------( 228      ( 25 Nov 2021 01:58 )             30.7     11-25    136  |  100  |  20.9<H>  ----------------------------<  123<H>  3.9   |  24.0  |  0.85    Ca    8.2<L>      25 Nov 2021 01:58  Phos  3.7     11-25  Mg     2.1     11-25    TPro  6.6  /  Alb  3.3  /  TBili  0.8  /  DBili  x   /  AST  17  /  ALT  16  /  AlkPhos  82  11-24      PHYSICAL EXAM:    Vital Signs Last 24 Hrs  T(C): 37.1 (25 Nov 2021 11:27), Max: 37.7 (24 Nov 2021 23:31)  T(F): 98.8 (25 Nov 2021 11:27), Max: 99.9 (24 Nov 2021 23:31)  HR: 79 (25 Nov 2021 11:27) (74 - 94)  BP: 135/86 (25 Nov 2021 11:27) (108/63 - 137/79)  BP(mean): 97 (24 Nov 2021 23:16) (97 - 97)  RR: 20 (25 Nov 2021 11:27) (18 - 20)  SpO2: 95% (25 Nov 2021 11:27) (94% - 100%)  Daily     Daily     Appearance: Alert, responsive, in no acute distress.  Neurological: Sensation is grossly intact to light touch. 5/5 motor function of all extremities. No focal deficits or weaknesses found.  Skin: no rash on visible skin. Skin is clean, dry and intact. No bleeding. No abrasions. No ulcerations.  Musculoskeletal: Left Lower Extremity: Calf painful to palpation. Knee has effusion noted and ROM limited 0-40 with discomfort. ROM toes. Pain with dorsi flexion         Imaging Studies:  EXAM:  XR KNEE 3 VIEWS LT                          PROCEDURE DATE:  11/25/2021          INTERPRETATION:  Clinical history: 61-year-old male, worsening pain.    Three views of the left knee without comparison demonstrate moderate hypertrophic/degenerative OA with no fracture or dislocation.    A small to moderate-sized suprapatellar effusion is noted.    IMPRESSION:  Moderate OA and small to moderate-sized suprapatellar effusion. If there is continued clinical concern follow-up MRI can be ordered    --- End of Report ---            MEGHAN KERN DO; Attending Radiologist  This document has been electronically signed. Nov 25 2021 12:49PM    ARTHROCENTSIS  PROCEDURE NOTE: Arthrocentesis    Performed by: Michaela Silva PA-C    Indication: Effusion / rule out septic joint    Consent: the risks and benefits of arthrocentesis discussed with patient, including the risk of bleeding, infection, and technical failure. The risks of not performing the procedure, failure to diagnose septic joint with resultant systemic infection, discussed with the patient. The alternatives of performing the procedure also discussed. Verbal consent was obtained following the discussion.    Universal Protocol: A time out was performed and the correct patient and site were verified.    The left knee joint was prepped and draped in proper sterile fashion. An 18 gauge needle was used to aspirate fluid from the joint using appropriate anatomic landmarks. Yellow straw colored tubid fluid was obtained from the joint. Approximately 85 milliliters of fluid was obtained. The fluid was then sent to the lab for appropriate studies. The site was bandaged and no complications were noted. The patient tolerated the procedure well.    Post-Procedure Diagnosis: Effusion  Complications: None  Specimens Removed: fluid only    A/P:  Pt is a  61y Male with left knee effusion, left lower leg DVT and B/L PE     PLAN:   * Left knee fluid sent to lab for cell count, crystals, cultures and gram stain   * Definitive plan to be determined once labs return   * Case discussed with Dr Martinez and medical team    ADDENDUM 11/25 19:00  Cell count [id number], No suspicion for infection. Ortho signing off. will f/u aspiration cultures.  Reconsult PRN

## 2021-11-25 NOTE — PROGRESS NOTE ADULT - PROBLEM SELECTOR PLAN 3
- LLE US that confirmed acute Left peroneal vein thrombosis and complex popliteal cyst.   - c/w heparin gtt

## 2021-11-25 NOTE — PROGRESS NOTE ADULT - SUBJECTIVE AND OBJECTIVE BOX
[facility] Division of Hospital Medicine    Chief Complaint: b/l PE     SUBJECTIVE / OVERNIGHT EVENTS: No acute events overnight. HD stable. Patient endorses LT-sided knee pain that is 10/10 and worsens with ambulation. Denies n/v/f/c/h/d.     Patient denies chest pain, SOB, abd pain, N/V, fever, chills, dysuria or any other complaints. All remainder ROS negative.     MEDICATIONS  (STANDING):  heparin  Infusion.  Unit(s)/Hr (17 mL/Hr) IV Continuous <Continuous>  influenza   Vaccine 0.5 milliLiter(s) IntraMuscular once  pantoprazole    Tablet 40 milliGRAM(s) Oral before breakfast  sodium chloride 0.9% lock flush 3 milliLiter(s) IV Push every 8 hours    MEDICATIONS  (PRN):  acetaminophen     Tablet .. 650 milliGRAM(s) Oral every 6 hours PRN Temp greater or equal to 38C (100.4F), Mild Pain (1 - 3)  aluminum hydroxide/magnesium hydroxide/simethicone Suspension 30 milliLiter(s) Oral every 4 hours PRN Dyspepsia  heparin   Injectable 7500 Unit(s) IV Push every 6 hours PRN For aPTT less than 40  heparin   Injectable 3500 Unit(s) IV Push every 6 hours PRN For aPTT between 40 - 57  melatonin 3 milliGRAM(s) Oral at bedtime PRN Insomnia  morphine  - Injectable 4 milliGRAM(s) IV Push every 4 hours PRN Moderate Pain (4 - 6)  ondansetron Injectable 4 milliGRAM(s) IV Push every 8 hours PRN Nausea and/or Vomiting        I&O's Summary      PHYSICAL EXAM:  Vital Signs Last 24 Hrs  T(C): 37.1 (25 Nov 2021 11:27), Max: 37.7 (24 Nov 2021 23:31)  T(F): 98.8 (25 Nov 2021 11:27), Max: 99.9 (24 Nov 2021 23:31)  HR: 79 (25 Nov 2021 11:27) (74 - 94)  BP: 135/86 (25 Nov 2021 11:27) (108/63 - 137/79)  BP(mean): 97 (24 Nov 2021 23:16) (97 - 97)  RR: 20 (25 Nov 2021 11:27) (18 - 20)  SpO2: 95% (25 Nov 2021 11:27) (94% - 100%)      CONSTITUTIONAL: moderate distress 2/2 knee pain, alert and awake  RESPIRATORY: Normal respiratory effort; lungs are clear to auscultation bilaterally. On 2L NC  CARDIOVASCULAR: Regular rate and rhythm, normal S1 and S2, no murmur/rub/gallop  ABDOMEN: Nontender to palpation, normoactive bowel sounds  MUSCLOSKELETAL: LT knee warm and tender to palpation, limited ROM  PSYCH: A+O to person, place, and time; affect appropriate  NEUROLOGY: CN 2-12 are intact and symmetric; no gross sensory deficits;     LABS:                        10.3   10.60 )-----------( 228      ( 25 Nov 2021 01:58 )             30.7     11-25    136  |  100  |  20.9<H>  ----------------------------<  123<H>  3.9   |  24.0  |  0.85    Ca    8.2<L>      25 Nov 2021 01:58  Phos  3.7     11-25  Mg     2.1     11-25    TPro  6.6  /  Alb  3.3  /  TBili  0.8  /  DBili  x   /  AST  17  /  ALT  16  /  AlkPhos  82  11-24    PT/INR - ( 25 Nov 2021 09:31 )   PT: 14.8 sec;   INR: 1.29 ratio         PTT - ( 25 Nov 2021 09:31 )  PTT:55.1 sec  CARDIAC MARKERS ( 24 Nov 2021 23:12 )  x     / 0.02 ng/mL / x     / x     / x      CARDIAC MARKERS ( 24 Nov 2021 19:26 )  x     / 0.03 ng/mL / x     / x     / x              CAPILLARY BLOOD GLUCOSE            RADIOLOGY & ADDITIONAL TESTS:  Results Reviewed:   Imaging Personally Reviewed:  < from: Xray Knee 3 Views, Left (11.25.21 @ 12:42) >  IMPRESSION:  Moderate OA and small to moderate-sized suprapatellar effusion. If there is continued clinical concern follow-up MRI can be ordered    --- End of Report ---            MEGHAN KERN DO; Attending Radiologist  This document has been electronically signed. Nov 25 2021 12:49PM    < end of copied text >    Electrocardiogram Personally Reviewed:

## 2021-11-25 NOTE — CONSULT NOTE ADULT - SUBJECTIVE AND OBJECTIVE BOX
Patient is a 61y old  Male who presents with a chief complaint of B/L PE (24 Nov 2021 23:16)      HPI:  60 y/o male sent from [facility] [place] after being diagnosed there with B/L PE with high clot burden and LLE DVT. Patient is s/p radical prostatectomy with lymph node dissection at [facility] [place] last week for prostate cancer. He was dcd home with Moreno and was supposed to f/u this Friday with moreno removal. He states he has been fairly mobile at home, eating and with not much pain. He went to [facility] [place] today as instructed by his Doctor who he called at [facility] [place] [facility] after he noted SOB and cough which was preceded by left calf pain the day prior.  He does admit to a superficial thrombus 3 years ago after arthroscopic surgery to remove bone fragments in his left knee. Denies any other history of VTE. Denies fever, chills, N/V, CP. No bleeding reported in catheter and abdominal/pelvic incisions clean and intact. In the ED patient with no RV strain on EKG, neg trop, not hypoxic, and normotensive. Started on Heparin drip at [facility] [place] and currently infusing.  pt c/o severe left knee pain.  Not relieve with morphine but better with IV ibuprofen.  No FH of DVT/PE.        REVIEW OF SYSTEMS:    CONSTITUTIONAL:as per HPI , left knee pain  EYES/ENT: No visual changes;  No vertigo or throat pain   NECK: No pain or stiffness  RESPIRATORY: No cough, wheezing, hemoptysis; No shortness of breath  CARDIOVASCULAR: No chest pain or palpitations  GASTROINTESTINAL: No abdominal or epigastric pain. No nausea, vomiting, or hematemesis; No diarrhea or constipation. No melena or hematochezia.  GENITOURINARY: No dysuria, frequency or hematuria  NEUROLOGICAL: No numbness or weakness  SKIN: No itching, burning, rashes, or lesions   All other review of systems is negative unless indicated above.    PAST MEDICAL & SURGICAL HISTORY:  Prostate cancer    GERD (gastroesophageal reflux disease)    Hypertension    H/O prostatectomy        SOCIAL HISTORY:    FAMILY HISTORY:  FH: CAD (coronary artery disease)        MEDICATIONS  (STANDING):  heparin  Infusion.  Unit(s)/Hr (17 mL/Hr) IV Continuous <Continuous>  influenza   Vaccine 0.5 milliLiter(s) IntraMuscular once  pantoprazole    Tablet 40 milliGRAM(s) Oral before breakfast  sodium chloride 0.9% lock flush 3 milliLiter(s) IV Push every 8 hours    MEDICATIONS  (PRN):  acetaminophen     Tablet .. 650 milliGRAM(s) Oral every 6 hours PRN Temp greater or equal to 38C (100.4F), Mild Pain (1 - 3)  aluminum hydroxide/magnesium hydroxide/simethicone Suspension 30 milliLiter(s) Oral every 4 hours PRN Dyspepsia  heparin   Injectable 7500 Unit(s) IV Push every 6 hours PRN For aPTT less than 40  heparin   Injectable 3500 Unit(s) IV Push every 6 hours PRN For aPTT between 40 - 57  melatonin 3 milliGRAM(s) Oral at bedtime PRN Insomnia  morphine  - Injectable 4 milliGRAM(s) IV Push every 4 hours PRN Moderate Pain (4 - 6)  ondansetron Injectable 4 milliGRAM(s) IV Push every 8 hours PRN Nausea and/or Vomiting      Allergies    No Known Allergies    Intolerances        Vital Signs Last 24 Hrs  T(C): 37.1 (25 Nov 2021 11:27), Max: 37.7 (24 Nov 2021 23:31)  T(F): 98.8 (25 Nov 2021 11:27), Max: 99.9 (24 Nov 2021 23:31)  HR: 79 (25 Nov 2021 11:27) (74 - 94)  BP: 135/86 (25 Nov 2021 11:27) (108/63 - 137/79)  BP(mean): 97 (24 Nov 2021 23:16) (97 - 97)  RR: 20 (25 Nov 2021 11:27) (18 - 20)  SpO2: 95% (25 Nov 2021 11:27) (94% - 100%)    PHYSICAL EXAM  General: adult in NAD  HEENT: clear oropharynx, anicteric sclera, pink conjunctiva  Neck: supple  CV: normal S1/S2 with no murmur rubs or gallops  Lungs: positive air movement b/l ant lungs,clear to auscultation, no wheezes, no rales  Abdomen: soft non-tender non-distended, no hepatosplenomegaly  Ext: Left knee extremely tender and warm , soft  Skin: no rashes and no petechiae  Neuro: alert and oriented X 4, no focal deficits      LABS:                          10.3   10.60 )-----------( 228      ( 25 Nov 2021 01:58 )             30.7         Mean Cell Volume : 90.0 fl  Mean Cell Hemoglobin : 30.2 pg  Mean Cell Hemoglobin Concentration : 33.6 gm/dL  Auto Neutrophil # : x  Auto Lymphocyte # : x  Auto Monocyte # : x  Auto Eosinophil # : x  Auto Basophil # : x  Auto Neutrophil % : x  Auto Lymphocyte % : x  Auto Monocyte % : x  Auto Eosinophil % : x  Auto Basophil % : x      Serial CBC's  11-25 @ 01:58  Hct-30.7 / Hgb-10.3 / Plat-228 / RBC-3.41 / WBC-10.60  Serial CBC's  11-25 @ 00:54  Hct-31.5 / Hgb-10.6 / Plat-229 / RBC-3.43 / WBC-10.84  Serial CBC's  11-24 @ 19:26  Hct-32.4 / Hgb-11.0 / Plat-255 / RBC-3.64 / WBC-12.54      11-25    136  |  100  |  20.9<H>  ----------------------------<  123<H>  3.9   |  24.0  |  0.85    Ca    8.2<L>      25 Nov 2021 01:58  Phos  3.7     11-25  Mg     2.1     11-25    TPro  6.6  /  Alb  3.3  /  TBili  0.8  /  DBili  x   /  AST  17  /  ALT  16  /  AlkPhos  82  11-24      PT/INR - ( 25 Nov 2021 09:31 )   PT: 14.8 sec;   INR: 1.29 ratio         PTT - ( 25 Nov 2021 09:31 )  PTT:55.1 sec                BLOOD SMEAR INTERPRETATION:       RADIOLOGY & ADDITIONAL STUDIES:

## 2021-11-25 NOTE — PROGRESS NOTE ADULT - PROBLEM SELECTOR PLAN 2
- Presents from [facility] with b/l PE w/ possible RT heart strain  - Will c/w heparin gtt (60-90)  - appreciate cardiology recs- cardiology will touch base with IC to evaluate for mechanical thrombectomy  - Galdamez in place to monitor for hematuria

## 2021-11-25 NOTE — CONSULT NOTE ADULT - ASSESSMENT
61M w/ PMHx HTN, newly diagnoed prostate CA s/p recent radical prostatectomy (11/19) at [facility] with new acute b/l PE and LLE DVT.    pt on Heparin drip   Hemodynamically stable on 4L NC   ICU eval noted;  no thrombolytic therapy advised due to risk of bleeding with recent surgery.   cont with Heparin gtt.   Likely switch to DOAC on discharge   hypercoag work up as outpt     Acute Left knee Effusion with tenderness and pain   - no h/o trauma; afebrile   - r/o inflammatory / septic arthritis  - Ortho eval   - IV tylenol , IV morphine for pain control     d/w Dr Tiffany Durham MD  [facility]  [phone]   
61M w/ PMHx HTN, prostate CA s/p recent radical prostatectomy (11/19) at [facility] [place] w/ indwelling moreno was sent over form [facility] [place] after being diagnosed to have a PE    Submassive acute PE w/ acute cor pulmonale   LLE acute DVT     Currently asymptomatic and hemodynamically stable. SpO2 in high 90s on 4LPM, no work of breathing. Trops/ BNP mildly elevated at [facility] [place]   sPESI score 8.9% risk of death in the “High” risk group (cancer history)  Continue on hep gtt. TTE/EKG pending   Monitor for hematuria given recent surgery   No ICU needs. Will activate PERT   D/w ED team   
62yo M w/ HTN, HLD and prostate CA, admitted after develping acute onset LLE pain, dyspnea and cough, diagnosed with "acute bilateral pulmonary emboli with high clot burden, evidence of right ventricular strain" and "Left peroneal vein thrombosis."

## 2021-11-26 LAB
APTT BLD: 116 SEC — HIGH (ref 27.5–35.5)
APTT BLD: 86.6 SEC — HIGH (ref 27.5–35.5)
COVID-19 NUCLEOCAPSID GAM AB INTERP: POSITIVE
COVID-19 NUCLEOCAPSID TOTAL GAM ANTIBODY RESULT: 8.35 INDEX — HIGH
COVID-19 SPIKE DOMAIN AB INTERP: POSITIVE
COVID-19 SPIKE DOMAIN ANTIBODY RESULT: 189 U/ML — HIGH
GRAM STN FLD: SIGNIFICANT CHANGE UP
HCT VFR BLD CALC: 31.6 % — LOW (ref 39–50)
HGB BLD-MCNC: 10.6 G/DL — LOW (ref 13–17)
MCHC RBC-ENTMCNC: 30 PG — SIGNIFICANT CHANGE UP (ref 27–34)
MCHC RBC-ENTMCNC: 33.5 GM/DL — SIGNIFICANT CHANGE UP (ref 32–36)
MCV RBC AUTO: 89.5 FL — SIGNIFICANT CHANGE UP (ref 80–100)
PLATELET # BLD AUTO: 251 K/UL — SIGNIFICANT CHANGE UP (ref 150–400)
RBC # BLD: 3.53 M/UL — LOW (ref 4.2–5.8)
RBC # FLD: 13.1 % — SIGNIFICANT CHANGE UP (ref 10.3–14.5)
SARS-COV-2 IGG+IGM SERPL QL IA: 189 U/ML — HIGH
SARS-COV-2 IGG+IGM SERPL QL IA: 8.35 INDEX — HIGH
SARS-COV-2 IGG+IGM SERPL QL IA: POSITIVE
SARS-COV-2 IGG+IGM SERPL QL IA: POSITIVE
SPECIMEN SOURCE: SIGNIFICANT CHANGE UP
WBC # BLD: 11.27 K/UL — HIGH (ref 3.8–10.5)
WBC # FLD AUTO: 11.27 K/UL — HIGH (ref 3.8–10.5)

## 2021-11-26 PROCEDURE — 99232 SBSQ HOSP IP/OBS MODERATE 35: CPT

## 2021-11-26 PROCEDURE — 99233 SBSQ HOSP IP/OBS HIGH 50: CPT

## 2021-11-26 RX ORDER — APIXABAN 2.5 MG/1
10 TABLET, FILM COATED ORAL EVERY 12 HOURS
Refills: 0 | Status: DISCONTINUED | OUTPATIENT
Start: 2021-11-26 | End: 2021-11-27

## 2021-11-26 RX ORDER — APIXABAN 2.5 MG/1
2 TABLET, FILM COATED ORAL
Qty: 168 | Refills: 0
Start: 2021-11-26 | End: 2021-12-02

## 2021-11-26 RX ORDER — APIXABAN 2.5 MG/1
10 TABLET, FILM COATED ORAL EVERY 12 HOURS
Refills: 0 | Status: DISCONTINUED | OUTPATIENT
Start: 2021-11-26 | End: 2021-11-26

## 2021-11-26 RX ORDER — PANTOPRAZOLE SODIUM 20 MG/1
1 TABLET, DELAYED RELEASE ORAL
Qty: 0 | Refills: 0 | DISCHARGE

## 2021-11-26 RX ADMIN — APIXABAN 10 MILLIGRAM(S): 2.5 TABLET, FILM COATED ORAL at 22:37

## 2021-11-26 RX ADMIN — Medication 650 MILLIGRAM(S): at 21:20

## 2021-11-26 RX ADMIN — Medication 1 TABLET(S): at 22:37

## 2021-11-26 RX ADMIN — Medication 650 MILLIGRAM(S): at 22:45

## 2021-11-26 RX ADMIN — PANTOPRAZOLE SODIUM 40 MILLIGRAM(S): 20 TABLET, DELAYED RELEASE ORAL at 06:26

## 2021-11-26 RX ADMIN — APIXABAN 10 MILLIGRAM(S): 2.5 TABLET, FILM COATED ORAL at 11:27

## 2021-11-26 RX ADMIN — HEPARIN SODIUM 1900 UNIT(S)/HR: 5000 INJECTION INTRAVENOUS; SUBCUTANEOUS at 00:48

## 2021-11-26 RX ADMIN — SODIUM CHLORIDE 3 MILLILITER(S): 9 INJECTION INTRAMUSCULAR; INTRAVENOUS; SUBCUTANEOUS at 21:04

## 2021-11-26 RX ADMIN — Medication 1 TABLET(S): at 11:27

## 2021-11-26 RX ADMIN — SODIUM CHLORIDE 3 MILLILITER(S): 9 INJECTION INTRAMUSCULAR; INTRAVENOUS; SUBCUTANEOUS at 06:21

## 2021-11-26 RX ADMIN — Medication 15 MILLIGRAM(S): at 11:25

## 2021-11-26 RX ADMIN — SODIUM CHLORIDE 3 MILLILITER(S): 9 INJECTION INTRAMUSCULAR; INTRAVENOUS; SUBCUTANEOUS at 14:39

## 2021-11-26 NOTE — PROGRESS NOTE ADULT - PROBLEM SELECTOR PLAN 3
- LLE US that confirmed acute Left peroneal vein thrombosis and complex popliteal cyst.   - Started Eliquis

## 2021-11-26 NOTE — PHYSICAL THERAPY INITIAL EVALUATION ADULT - PERTINENT HX OF CURRENT PROBLEM, REHAB EVAL
Pt is a 62 y/o male who [place] [facility] [place]. Pt has hx/o prostate CA with recent surgical intervention. Left knee pain and effusion started three days ago.

## 2021-11-26 NOTE — DISCHARGE NOTE PROVIDER - HOSPITAL COURSE
62 y/o male sent from [facility] after being diagnosed there with B/L PE with high clot burden and LLE DVT. Patient is s/p radical [place] [facility] [place]. He was dcd home with Moreno and was supposed to f/u this Friday with Moreno removal. He states he has been fairly mobile at home, eating and with not much pain. He went to [facility] today as instructed by his Doctor who he [place] [facility] [place] he noted SOB and cough which was preceded by left calf pain the day prior.  He does admit to a superficial thrombus 3 years ago after arthroscopic surgery to remove bone fragments in his left knee. Denies any other history of VTE. Denies fever, chills, N/V, CP. No bleeding reported in catheter and abdominal/pelvic incisions clean and intact. In the ED patient with no RV strain on EKG, neg trop, not hypoxic, and normotensive. Started on Heparin drip at [facility] and transferred to our facility. During hospitalization pt complained of severe left knee pain, and ortho was consulted.  Ortho team preformed Arthrocentesis specimen to lab for testing.  Pt was given IV NSAID, which management pain.  Heme/onc was consulted for management of b/l PE and LLE DVT and recommended continue current regimen and follow up outpt for further hypercoag workout. On day before discharge pt was started on DOAC.  Pt also had moreno removed to day by RN, after discussion with surgical. 62 y/o male sent from [facility] after being diagnosed there with B/L PE with high clot burden and LLE DVT. Patient is s/p radical prostatectomy with lymph node dissection at [facility] last week for prostate cancer. He was dcd home with Moreno and was supposed to f/u this Friday with Moreno removal. He states he has been fairly mobile at home, eating and with not much pain. He went to [facility] today as instructed by his Doctor who he called at [facility] after he noted SOB and cough which was preceded by left calf pain the day prior.  He does admit to a superficial thrombus 3 years ago after arthroscopic surgery to remove bone fragments in his left knee. Denies any other history of VTE. Denies fever, chills, N/V, CP. No bleeding reported in catheter and abdominal/pelvic incisions clean and intact. In the ED patient with no RV strain on EKG, neg trop, not hypoxic, and normotensive. Started on Heparin drip at [facility] and transferred to our facility. During hospitalization pt complained of severe left knee pain, and ortho was consulted.  Ortho team preformed Arthrocentesis specimen to lab for testing.  Pt was given IV NSAID, which management pain.  Heme/onc was consulted for management of b/l PE and LLE DVT and recommended continue current regimen and follow up outpt for further hypercoag workout. On day before discharge pt was started on DOAC.  Pt also had moreno removed to day by RN, after discussion with his [facility] surgical team. Cultures from arthrocentesis were negative. Patient spiked fever on 11/26, he refused further work up and wanted to be discharged home. fever was a one time event. Advised patient to return to hospital if he continues to have fever. He Denies dysuria, cough and abdominal pain.   Patient is to follow up with his oncologist at [facility] and his PCP.     Vital Signs Last 24 Hrs  T(C): 37.2 (27 Nov 2021 10:58), Max: 38.7 (26 Nov 2021 21:10)  T(F): 99 (27 Nov 2021 10:58), Max: 101.7 (26 Nov 2021 21:10)  HR: 80 (27 Nov 2021 10:58) (78 - 88)  BP: 125/79 (27 Nov 2021 10:58) (117/70 - 143/82)  BP(mean): --  RR: 18 (27 Nov 2021 10:58) (18 - 18)  SpO2: 96% (27 Nov 2021 10:58) (93% - 96%)    PHYSICAL EXAM:  Constitutional: No acute distress, alert and oriented by 3  HEENT: AT/NC, EOMI, PERRLA, Normal conjunctiva, no pharyngeal erythema, moist oral mucosa  Respiratory: CTA BL, equal breath sounds, no crackles or wheezing  Cardiovascular: RRR, no edema  Gastrointestinal: soft, Non-tender, Non-distended + Bowel sounds, no rebound or guarding  Musculoskeletal: left knee swelling.   Neurological: CN 2-12 grossly intact, no focal deficits  Skin: warm, dry and intact  Psychiatric: normal mood and affect    40 minutes spent on discharge.      60 y/o male sent from [facility] after being diagnosed there with B/L PE with high clot burden and LLE DVT. Patient is s/p radical prostatectomy with lymph node dissection at [facility] last week for prostate cancer. He was dcd home with Moreno and was supposed to f/u this Friday with Moreno removal. He states he has been fairly mobile at home, eating and with not much pain. He went to [facility] today as instructed by his Doctor who he called at [facility] after he noted SOB and cough which was preceded by left calf pain the day prior.  He does admit to a superficial thrombus 3 years ago after arthroscopic surgery to remove bone fragments in his left knee. Denies any other history of VTE. Denies fever, chills, N/V, CP. No bleeding reported in catheter and abdominal/pelvic incisions clean and intact. In the ED patient with no RV strain on EKG, neg trop, not hypoxic, and normotensive. Started on Heparin drip at [facility] and transferred to our facility. During hospitalization pt complained of severe left knee pain, and ortho was consulted.  Ortho team preformed Arthrocentesis specimen to lab for testing.  Pt was given IV NSAID, which management pain.  Heme/onc was consulted for management of b/l PE and LLE DVT and recommended continue current regimen and follow up outpt for further hypercoag workout. On day before discharge pt was started on DOAC.  Pt also had moreno removed to day by RN, after discussion with his [facility] surgical team. Cultures from arthrocentesis were negative. Patient spiked fever on 11/26, he refused further work up and wanted to be discharged home. fever was a one time event. Advised patient to return to hospital if he continues to have fever. He Denies dysuria, cough and abdominal pain.   Patient is to follow up with his oncologist at [facility] and his PCP.   on day of discharge patient ambulated on room air saturating >93%.     Vital Signs Last 24 Hrs  T(C): 37.2 (27 Nov 2021 10:58), Max: 38.7 (26 Nov 2021 21:10)  T(F): 99 (27 Nov 2021 10:58), Max: 101.7 (26 Nov 2021 21:10)  HR: 80 (27 Nov 2021 10:58) (78 - 88)  BP: 125/79 (27 Nov 2021 10:58) (117/70 - 143/82)  BP(mean): --  RR: 18 (27 Nov 2021 10:58) (18 - 18)  SpO2: 96% (27 Nov 2021 10:58) (93% - 96%)    PHYSICAL EXAM:  Constitutional: No acute distress, alert and oriented by 3  HEENT: AT/NC, EOMI, PERRLA, Normal conjunctiva, no pharyngeal erythema, moist oral mucosa  Respiratory: CTA BL, equal breath sounds, no crackles or wheezing  Cardiovascular: RRR, no edema  Gastrointestinal: soft, Non-tender, Non-distended + Bowel sounds, no rebound or guarding  Musculoskeletal: left knee swelling.   Neurological: CN 2-12 grossly intact, no focal deficits  Skin: warm, dry and intact  Psychiatric: normal mood and affect    40 minutes spent on discharge.

## 2021-11-26 NOTE — PROGRESS NOTE ADULT - PROBLEM SELECTOR PLAN 2
- Presents from [facility] with b/l PE w/ possible RT heart strain  - Will c/w heparin gtt (60-90)  - appreciate cardiology recs- cardiology will touch base with IC to evaluate for mechanical thrombectomy  - Galdamez in place to monitor for hematuria. per discussion with outpatient oncologist, will discontinue Galdamez and start bactrim ppx. - Presents from [facility] with b/l PE w/ possible RT heart strain  - Started Eliquis  - appreciate cardiology recs- cardiology will touch base with IC to evaluate for mechanical thrombectomy  - Galdamez in place to monitor for hematuria. per discussion with outpatient oncologist, will discontinue Galdamez and start bactrim ppx.

## 2021-11-26 NOTE — PROGRESS NOTE ADULT - SUBJECTIVE AND OBJECTIVE BOX
[facility] Division of Hospital Medicine    Chief Complaint: b/l PE    SUBJECTIVE / OVERNIGHT EVENTS: No acute events overnight. HD stable. Patient is requiring 3L NC. His LT knee pain is 6/10 with ambulation.     Patient denies chest pain, SOB, abd pain, N/V, fever, chills, dysuria or any other complaints. All remainder ROS negative.     MEDICATIONS  (STANDING):  apixaban 10 milliGRAM(s) Oral every 12 hours  influenza   Vaccine 0.5 milliLiter(s) IntraMuscular once  pantoprazole    Tablet 40 milliGRAM(s) Oral before breakfast  sodium chloride 0.9% lock flush 3 milliLiter(s) IV Push every 8 hours  trimethoprim  160 mG/sulfamethoxazole 800 mG 1 Tablet(s) Oral once    MEDICATIONS  (PRN):  acetaminophen     Tablet .. 650 milliGRAM(s) Oral every 6 hours PRN Temp greater or equal to 38C (100.4F), Mild Pain (1 - 3)  aluminum hydroxide/magnesium hydroxide/simethicone Suspension 30 milliLiter(s) Oral every 4 hours PRN Dyspepsia  ketorolac   Injectable 15 milliGRAM(s) IV Push every 6 hours PRN Severe Pain (7 - 10)  melatonin 3 milliGRAM(s) Oral at bedtime PRN Insomnia  ondansetron Injectable 4 milliGRAM(s) IV Push every 8 hours PRN Nausea and/or Vomiting        I&O's Summary    26 Nov 2021 07:01  -  26 Nov 2021 13:22  --------------------------------------------------------  IN: 557 mL / OUT: 2150 mL / NET: -1593 mL        PHYSICAL EXAM:  Vital Signs Last 24 Hrs  T(C): 37.1 (26 Nov 2021 11:34), Max: 37.7 (25 Nov 2021 15:27)  T(F): 98.7 (26 Nov 2021 11:34), Max: 99.8 (25 Nov 2021 15:27)  HR: 78 (26 Nov 2021 11:34) (78 - 104)  BP: 105/67 (26 Nov 2021 11:34) (105/67 - 155/87)  BP(mean): --  RR: 18 (26 Nov 2021 11:34) (18 - 18)  SpO2: 95% (26 Nov 2021 11:34) (95% - 98%)    CONSTITUTIONAL: moderate distress 2/2 knee pain, alert and awake  RESPIRATORY: Normal respiratory effort; lungs are clear to auscultation bilaterally. On 2L NC  CARDIOVASCULAR: Regular rate and rhythm, normal S1 and S2, no murmur/rub/gallop  ABDOMEN: Nontender to palpation, normoactive bowel sounds  MUSCULOSKELETAL: LT knee mildly tender to palpation, limited ROM  PSYCH: A+O to person, place, and time; affect appropriate  NEUROLOGY: CN 2-12 are intact and symmetric; no gross sensory deficits;     LABS:                        10.6   11.27 )-----------( 251      ( 26 Nov 2021 04:39 )             31.6     11-25    136  |  100  |  20.9<H>  ----------------------------<  123<H>  3.9   |  24.0  |  0.85    Ca    8.2<L>      25 Nov 2021 01:58  Phos  3.7     11-25  Mg     2.1     11-25    TPro  6.6  /  Alb  3.3  /  TBili  0.8  /  DBili  x   /  AST  17  /  ALT  16  /  AlkPhos  82  11-24    PT/INR - ( 25 Nov 2021 09:31 )   PT: 14.8 sec;   INR: 1.29 ratio         PTT - ( 26 Nov 2021 07:36 )  PTT:86.6 sec  CARDIAC MARKERS ( 24 Nov 2021 23:12 )  x     / 0.02 ng/mL / x     / x     / x      CARDIAC MARKERS ( 24 Nov 2021 19:26 )  x     / 0.03 ng/mL / x     / x     / x              Culture - Body Fluid with Gram Stain (collected 26 Nov 2021 01:24)  Source: Joint Fl Joint Fluid  Gram Stain (26 Nov 2021 02:51):    polymorphonuclear leukocytes seen    No organisms seen    by cytocentrifuge      CAPILLARY BLOOD GLUCOSE            RADIOLOGY & ADDITIONAL TESTS:  Results Reviewed:   Imaging Personally Reviewed:  Electrocardiogram Personally Reviewed:

## 2021-11-26 NOTE — PHYSICAL THERAPY INITIAL EVALUATION ADULT - ADDITIONAL COMMENTS
Pt lives in a house with 2 platform steps to enter and a flight of stairs to bedroom. Pt states he can stay on the first floor.

## 2021-11-26 NOTE — DISCHARGE NOTE PROVIDER - CARE PROVIDER_API CALL
MSK,   Please follow up with [facility]  Phone: (   )    -  Fax: (   )    -  Follow Up Time:

## 2021-11-26 NOTE — DISCHARGE NOTE PROVIDER - NSDCMRMEDTOKEN_GEN_ALL_CORE_FT
Eliquis Starter Pack for Treatment of DVT and PE 5 mg oral tablet: 2 tab(s) orally 12 times a day   naproxen 500 mg oral tablet: 1 tab(s) orally 2 times a day   Norvasc 10 mg oral tablet: 1 tab(s) orally once a day  Percocet 5 mg-325 mg oral tablet: 1 tab(s) orally every 6 hours, As Needed  Protonix 40 mg oral granule, delayed release: 1 each orally once a day   Eliquis Starter Pack for Treatment of DVT and PE 5 mg oral tablet: 2 tab(s) orally 12 times a day   naproxen 500 mg oral tablet: 1 tab(s) orally 2 times a day   Protonix 40 mg oral granule, delayed release: 1 each orally once a day   Eliquis Starter Pack for Treatment of DVT and PE 5 mg oral tablet: 2 tab(s) orally 12 times a day   naproxen 500 mg oral tablet: 1 tab(s) orally 2 times a day   Physical Therapdy: Please evaluate and treat for Physical Therapy for left knee pain  dx arthritis  Protonix 40 mg oral granule, delayed release: 1 each orally once a day

## 2021-11-26 NOTE — PROGRESS NOTE ADULT - ASSESSMENT
61M w/ PMHx HTN, newly diagnoed prostate CA s/p recent radical prostatectomy (11/19) at [facility] with new acute b/l PE and LLE DVT.    pt on Heparin drip   Hemodynamically stable on 4L NC   ICU eval noted;  no thrombolytic therapy advised due to risk of bleeding with recent surgery.   cont with Heparin gtt.   Likely switch to DOAC on discharge   hypercoag work up as outpt   PT eval     Acute Left knee Effusion with tenderness and pain   - no h/o trauma; afebrile   - r/o inflammatory / septic arthritis  - s/p arthrocentesis 11/25; non bloody , not purulent   - f/u cultures ; non infectious etiology most likely    - IV tylenol , IV morphine for pain control     d/w Dr Tiffany Durham MD  [facility]  [phone]   
A/P: 62yo M w/ HTN, HLD and prostate CA, admitted after develping acute onset LLE pain, dyspnea and cough, diagnosed with "acute bilateral pulmonary emboli with high clot burden, evidence of right ventricular strain" and "Left peroneal vein thrombosis."    Pulmonary Embolism  - Bilateral PE on CTA.   - Echo with mildly enlarged RV and moderately reduced RV function.   - Troponins negative.   - Elevated BNP.   - No indication for intervention at this time.   - Continue with heparin gtt and transition to NOAC tonight.   - PT evaluation to evaluate oxygenation with ambulation.   - Will need to continue on AC for 3-6 months.   - Heme/Onc evaluation as outpatient.     HTN  - Continue home meds.     Assessment and recommendations are final when note is signed by the attending.   
61M w/ PMHx HTN, newly diagnoed prostate CA s/p recent radical prostatectomy (11/19) at [facility] with new acute b/l PE and LLE DVT.  C/b LT knee found to have moderate-sized effusion. 
61M w/ PMHx HTN, newly diagnoed prostate CA s/p recent radical prostatectomy (11/19) at [facility] [place] with new acute b/l PE and LLE DVT.  C/b LT knee found to have moderate-sized effusion. S/p arthrocentesis. Pending PT evaluation and LT knee pain control.

## 2021-11-26 NOTE — PROGRESS NOTE ADULT - ATTENDING COMMENTS
Patient with submassive PE.   Low intermediate risk.   Low level BNP and troponins.     Medical management recommended with Po AC.

## 2021-11-26 NOTE — PROGRESS NOTE ADULT - PROBLEM SELECTOR PLAN 1
- Patient with LT knee tenderness and pain  - X-Ray LT knee- OA and small to moderate-sized suprapatellar effusion  - Will c/w pain control with dilaudid PRN for moderate and severe pain  - ortho c/s placed for arthrocentesis to r/o inflammatory vs septic arithritis
- Patient with LT knee tenderness and pain  - X-Ray LT knee- OA and small to moderate-sized suprapatellar effusion  - Will c/w pain control with toradol PRN for severe pain  - ortho c/s placed- s/p LT knee arthrocentesis; negative for infection

## 2021-11-26 NOTE — PROGRESS NOTE ADULT - SUBJECTIVE AND OBJECTIVE BOX
[facility] [facility]                                                               Office: [address] [place] [address]                                                              Telephone: [phone]. Fax:[phone]                                                                             PROGRESS NOTE  Reason for follow up: Pulmonary Embolism  Update: Patient resting comfortably at this time, but denies any significant improvement in breathing. Patient had left knee effusion that was aspirated yesterday with improvement. Patient's echo showed mildly enlarged RV with moderately reduced function, elevated pBNP, but negative troponins.   Covid Status: Negative 11/25/21    Review of symptoms:   Cardiac:  No chest pain. + dyspnea. No palpitations.  Respiratory: No cough. + dyspnea  Gastrointestinal: No diarrhea. No abdominal pain. No bleeding.     Past medical history: No updates.   	  Vitals:  T(C): 37 (11-26-21 @ 07:39), Max: 37.7 (11-25-21 @ 15:27)  HR: 83 (11-26-21 @ 07:39) (79 - 104)  BP: 130/83 (11-26-21 @ 07:39) (130/83 - 155/87)  RR: 18 (11-26-21 @ 07:39) (18 - 20)  SpO2: 97% (11-26-21 @ 07:39) (95% - 98%)  Wt(kg): --  I&O's Summary    Weight (kg): 91.9 (11-24 @ 18:31)      PHYSICAL EXAM:  Appearance: Comfortable. No acute distress  HEENT:  Head and neck: Atraumatic. Normocephalic.  Normal oral mucosa, PERRL, Neck is supple. No JVD, No carotid bruit.   Neurologic: A&Ox 3, no focal deficits. EOMI, Cranial nerves are intact.  Lymphatic: No cervical lymphadenopathy  Cardiovascular: Normal S1 S2, No murmur, rubs/gallops. No JVD, No edema  Respiratory: Lungs clear to auscultation  Gastrointestinal:  Soft, Non-tender, + BS  Lower Extremities: No edema  Psychiatry: Patient is calm. No agitation. Mood & affect appropriate  Skin: No rashes/ecchymoses/cyanosis/ulcers visualized on the face, hands or feet.      CURRENT MEDICATIONS:    pantoprazole    Tablet  heparin  Infusion.  influenza   Vaccine  sodium chloride 0.9% lock flush      DIAGNOSTIC TESTING:  [ ] Echocardiogram:   [ ]  Catheterization:  [ ] Stress Test:    OTHER: 	      LABS:	 	  CARDIAC MARKERS ( 24 Nov 2021 23:12 )  x     / 0.02 ng/mL / x     / x     / x      p-BNP 24 Nov 2021 23:12: 450 pg/mL, CARDIAC MARKERS ( 24 Nov 2021 19:26 )  x     / 0.03 ng/mL / x     / x     / x      p-BNP 24 Nov 2021 19:26: 333 pg/mL                          10.6   11.27 )-----------( 251      ( 26 Nov 2021 04:39 )             31.6     11-25    136  |  100  |  20.9<H>  ----------------------------<  123<H>  3.9   |  24.0  |  0.85    Ca    8.2<L>      25 Nov 2021 01:58  Phos  3.7     11-25  Mg     2.1     11-25    TPro  6.6  /  Alb  3.3  /  TBili  0.8  /  DBili  x   /  AST  17  /  ALT  16  /  AlkPhos  82  11-24    proBNP: Serum Pro-Brain Natriuretic Peptide: 450 pg/mL (11-24 @ 23:12)  Serum Pro-Brain Natriuretic Peptide: 333 pg/mL (11-24 @ 19:26)    Lipid Profile:   HgA1c:   TSH:       TELEMETRY: Reviewed    ECG:  Reviewed by me. 	                                                                      [facility]                                                       [facility]                                                               Office: [address] [place] [address]                                                              Telephone: [phone]. Fax:[phone]                                                                             PROGRESS NOTE  Reason for follow up: Pulmonary Embolism  Update: Patient resting comfortably at this time, but denies any significant improvement in breathing. Patient had left knee effusion that was aspirated yesterday with improvement. Patient's echo showed mildly enlarged RV with moderately reduced function, elevated pBNP, but negative troponins. Patient has not yet ambulated.   Covid Status: Negative 11/25/21    Review of symptoms:   Cardiac:  No chest pain. + dyspnea. No palpitations.  Respiratory: No cough. + dyspnea  Gastrointestinal: No diarrhea. No abdominal pain. No bleeding.     Past medical history: No updates.   	  Vitals:  T(C): 37 (11-26-21 @ 07:39), Max: 37.7 (11-25-21 @ 15:27)  HR: 83 (11-26-21 @ 07:39) (79 - 104)  BP: 130/83 (11-26-21 @ 07:39) (130/83 - 155/87)  RR: 18 (11-26-21 @ 07:39) (18 - 20)  SpO2: 97% (11-26-21 @ 07:39) (95% - 98%)  Wt(kg): --  I&O's Summary    Weight (kg): 91.9 (11-24 @ 18:31)      PHYSICAL EXAM:  Constitutional: Comfortable mild resp. distress on 3L NC   HEENT: Atraumatic, EOMi, neck is supple, no JVD, No carotid bruit   CNS: A&Ox3. No focal motor deficits, sensory intact   Respiratory: CTA no wheezing or rales,   Cardiovascular: S1S2 RRR. No murmur/rubs  Gastrointestinal: Soft, + tenderness in lower Qs, non distended +Bowel sounds  Extremities: + LLE calf tenderness to fine touch, mild non pitting edema    Psychiatric: Calm, no agitation  Skin: warm to touch, no skin rash/ulcers visualized to face, hands or feet      CURRENT MEDICATIONS:    pantoprazole    Tablet  heparin  Infusion.  influenza   Vaccine  sodium chloride 0.9% lock flush      DIAGNOSTIC TESTING:  [ ] Echocardiogram:   < from: TTE Echo Complete w/o Contrast w/ Doppler (11.24.21 @ 20:12) >    PHYSICIAN INTERPRETATION:  Left Ventricle: The left ventricular internal cavity size is normal. Left ventricular wall thickness is mildly increased.  Global LV systolic function was hyperdynamic. Left ventricular ejection fraction, by visual estimation, is 70 to 75%. Spectral Doppler shows normal pattern of LV diastolic filling.  Right Ventricle: The right ventricular size is mildly enlarged. RV systolic function is moderately reduced. Moderate to severe.  Left Atrium: Normal left atrial size.  Right Atrium: Normal right atrial size.  Pericardium: There is no evidence of pericardial effusion.  Mitral Valve: Mild thickening of the anterior and posterior mitral valve leaflets. Mild mitral valve regurgitation is seen.  Tricuspid Valve: The tricuspid valve is normal in structure. Mild tricuspid regurgitation is visualized. Estimated pulmonary artery systolic pressure is 46.0 mmHg assuming a right atrial pressure of 3 mmHg, which is consistent with mild pulmonary hypertension.  Aortic Valve: The aortic valve is trileaflet. Sclerotic aortic valve with normal opening. Peak transaortic gradient equals 8.5 mmHg, mean transaortic gradient equals 4.3 mmHg, the calculated aortic valve area equals 3.33 cm² by the continuity equation consistent with normally opening aortic valve. No evidence of aortic valve regurgitation is seen.  Pulmonic Valve: The pulmonic valve is normal. Trace pulmonic valve regurgitation.  Aorta: There is dilatation of the aortic root and sinuses of Valsalva.  Pulmonary Artery: The main pulmonary artery is normal in size.  Venous: A normal flow pattern is recorded from the right upper pulmonary vein. The inferior vena cava was normal sized, with respiratory size variation greater than 50%.      Summary:   1. Left ventricular ejection fraction, by visual estimation, is 70 to 75%.   2. Hyperdynamic global left ventricular systolic function.   3. There is mild concentric left ventricular hypertrophy.   4. Mildly enlarged right ventricle.   5. Moderately reduced RV systolic function.   6. Mild mitral valve regurgitation.   7. Mild tricuspid regurgitation.   8. Estimated pulmonary artery systolic pressure is 46.0 mmHg assuming a right atrial pressure of 3 mmHg, which is consistent with mild pulmonary hypertension.   9. Dilatation of the aortic root and sinuses of Valsalva.  10. Results d/w the ordering provider.    MD Nikki Electronically signed on 11/24/2021 at 11:34:56 PM    < end of copied text >    OTHER: 	  CTA with extensive clot within left and right main pulmonary artery extending into multiple b/l lower branches.     LABS:	 	  CARDIAC MARKERS ( 24 Nov 2021 23:12 )  x     / 0.02 ng/mL / x     / x     / x      p-BNP 24 Nov 2021 23:12: 450 pg/mL, CARDIAC MARKERS ( 24 Nov 2021 19:26 )  x     / 0.03 ng/mL / x     / x     / x      p-BNP 24 Nov 2021 19:26: 333 pg/mL                          10.6   11.27 )-----------( 251      ( 26 Nov 2021 04:39 )             31.6     11-25    136  |  100  |  20.9<H>  ----------------------------<  123<H>  3.9   |  24.0  |  0.85    Ca    8.2<L>      25 Nov 2021 01:58  Phos  3.7     11-25  Mg     2.1     11-25    TPro  6.6  /  Alb  3.3  /  TBili  0.8  /  DBili  x   /  AST  17  /  ALT  16  /  AlkPhos  82  11-24    proBNP: Serum Pro-Brain Natriuretic Peptide: 450 pg/mL (11-24 @ 23:12)  Serum Pro-Brain Natriuretic Peptide: 333 pg/mL (11-24 @ 19:26)    Lipid Profile:   HgA1c:   TSH:       TELEMETRY: Reviewed  SR

## 2021-11-26 NOTE — DISCHARGE NOTE PROVIDER - NSDCCPCAREPLAN_GEN_ALL_CORE_FT
PRINCIPAL DISCHARGE DIAGNOSIS  Diagnosis: Pulmonary embolism  Assessment and Plan of Treatment: Continue Eliquis twice a day  follow up with [facility]      SECONDARY DISCHARGE DIAGNOSES  Diagnosis: DVT, lower extremity  Assessment and Plan of Treatment: Continue Eliquis    Diagnosis: Left knee pain  Assessment and Plan of Treatment: follow up with [facility]  out pt PT  Alternate between Tylenol and and Motrin/Naproxen as needed

## 2021-11-26 NOTE — PROGRESS NOTE ADULT - SUBJECTIVE AND OBJECTIVE BOX
Patient is a 61y old  Male who presents with a chief complaint of B/L PE (24 Nov 2021 23:16)      HPI:  60 y/o male sent from [facility] after being diagnosed there with B/L PE with high clot burden and LLE DVT. Patient is s/p radical prostatectomy with lymph node dissection at [facility] last week for prostate cancer. He was dcd home with Moreno and was supposed to f/u this Friday with moreno removal. He states he has been fairly mobile at home, eating and with not much pain. He went to [facility] today as instructed by his Doctor who he called at [facility] after he noted SOB and cough which was preceded by left calf pain the day prior.  He does admit to a superficial thrombus 3 years ago after arthroscopic surgery to remove bone fragments in his left knee. Denies any other history of VTE. Denies fever, chills, N/V, CP. No bleeding reported in catheter and abdominal/pelvic incisions clean and intact. In the ED patient with no RV strain on EKG, neg trop, not hypoxic, and normotensive. Started on Heparin drip at [facility] and currently infusing.  pt c/o severe left knee pain.  Not relieve with morphine but better with IV ibuprofen.  No FH of DVT/PE.     s/p tap of left knee effusion   cultures pending; afebrile  pain much better ; limited ROM   still on 4L O2        REVIEW OF SYSTEMS:    CONSTITUTIONAL:as per HPI , left knee pain  EYES/ENT: No visual changes;  No vertigo or throat pain   NECK: No pain or stiffness  RESPIRATORY: No cough, wheezing, hemoptysis; No shortness of breath  CARDIOVASCULAR: No chest pain or palpitations  GASTROINTESTINAL: No abdominal or epigastric pain. No nausea, vomiting, or hematemesis; No diarrhea or constipation. No melena or hematochezia.  GENITOURINARY: No dysuria, frequency or hematuria  NEUROLOGICAL: No numbness or weakness  SKIN: No itching, burning, rashes, or lesions   All other review of systems is negative unless indicated above.    PAST MEDICAL & SURGICAL HISTORY:  Prostate cancer    GERD (gastroesophageal reflux disease)    Hypertension    H/O prostatectomy        SOCIAL HISTORY:    FAMILY HISTORY:  FH: CAD (coronary artery disease)        MEDICATIONS  (STANDING):  heparin  Infusion.  Unit(s)/Hr (17 mL/Hr) IV Continuous <Continuous>  influenza   Vaccine 0.5 milliLiter(s) IntraMuscular once  pantoprazole    Tablet 40 milliGRAM(s) Oral before breakfast  sodium chloride 0.9% lock flush 3 milliLiter(s) IV Push every 8 hours    MEDICATIONS  (PRN):  acetaminophen     Tablet .. 650 milliGRAM(s) Oral every 6 hours PRN Temp greater or equal to 38C (100.4F), Mild Pain (1 - 3)  aluminum hydroxide/magnesium hydroxide/simethicone Suspension 30 milliLiter(s) Oral every 4 hours PRN Dyspepsia  heparin   Injectable 7500 Unit(s) IV Push every 6 hours PRN For aPTT less than 40  heparin   Injectable 3500 Unit(s) IV Push every 6 hours PRN For aPTT between 40 - 57  melatonin 3 milliGRAM(s) Oral at bedtime PRN Insomnia  morphine  - Injectable 4 milliGRAM(s) IV Push every 4 hours PRN Moderate Pain (4 - 6)  ondansetron Injectable 4 milliGRAM(s) IV Push every 8 hours PRN Nausea and/or Vomiting      Allergies    No Known Allergies    Intolerances    Vital Signs Last 24 Hrs  T(C): 37 (26 Nov 2021 07:39), Max: 37.7 (25 Nov 2021 15:27)  T(F): 98.6 (26 Nov 2021 07:39), Max: 99.8 (25 Nov 2021 15:27)  HR: 83 (26 Nov 2021 07:39) (79 - 104)  BP: 130/83 (26 Nov 2021 07:39) (130/83 - 155/87)  BP(mean): --  RR: 18 (26 Nov 2021 07:39) (18 - 20)  SpO2: 97% (26 Nov 2021 07:39) (95% - 98%)  PHYSICAL EXAM  General: adult in NAD  HEENT: clear oropharynx, anicteric sclera, pink conjunctiva  Neck: supple  CV: normal S1/S2 with no murmur rubs or gallops  Lungs: positive air movement b/l ant lungs,clear to auscultation, no wheezes, no rales  Abdomen: soft non-tender non-distended, no hepatosplenomegaly  Ext: Left knee extremely tender and warm , soft  Skin: no rashes and no petechiae  Neuro: alert and oriented X 4, no focal deficits      LABS:                          10.6   11.27 )-----------( 251      ( 26 Nov 2021 04:39 )             31.6                             10.3   10.60 )-----------( 228      ( 25 Nov 2021 01:58 )             30.7         Mean Cell Volume : 90.0 fl  Mean Cell Hemoglobin : 30.2 pg  Mean Cell Hemoglobin Concentration : 33.6 gm/dL  Auto Neutrophil # : x  Auto Lymphocyte # : x  Auto Monocyte # : x  Auto Eosinophil # : x  Auto Basophil # : x  Auto Neutrophil % : x  Auto Lymphocyte % : x  Auto Monocyte % : x  Auto Eosinophil % : x  Auto Basophil % : x      Serial CBC's  11-25 @ 01:58  Hct-30.7 / Hgb-10.3 / Plat-228 / RBC-3.41 / WBC-10.60  Serial CBC's  11-25 @ 00:54  Hct-31.5 / Hgb-10.6 / Plat-229 / RBC-3.43 / WBC-10.84  Serial CBC's  11-24 @ 19:26  Hct-32.4 / Hgb-11.0 / Plat-255 / RBC-3.64 / WBC-12.54      11-25    136  |  100  |  20.9<H>  ----------------------------<  123<H>  3.9   |  24.0  |  0.85    Ca    8.2<L>      25 Nov 2021 01:58  Phos  3.7     11-25  Mg     2.1     11-25    TPro  6.6  /  Alb  3.3  /  TBili  0.8  /  DBili  x   /  AST  17  /  ALT  16  /  AlkPhos  82  11-24      PT/INR - ( 25 Nov 2021 09:31 )   PT: 14.8 sec;   INR: 1.29 ratio         PTT - ( 25 Nov 2021 09:31 )  PTT:55.1 sec                BLOOD SMEAR INTERPRETATION:       RADIOLOGY & ADDITIONAL STUDIES:

## 2021-11-26 NOTE — PHYSICAL THERAPY INITIAL EVALUATION ADULT - ACTIVE RANGE OF MOTION EXAMINATION, REHAB EVAL
left knee flexion to 45deg/bilateral upper extremity Active ROM was WFL (within functional limits)/bilateral  lower extremity Active ROM was WFL (within functional limits)/deficits as listed below

## 2021-11-26 NOTE — DISCHARGE NOTE PROVIDER - PROVIDER TOKENS
FREE:[LAST:[MSK],PHONE:[(   )    -],FAX:[(   )    -],ADDRESS:[Please follow up with [facility]]]

## 2021-11-26 NOTE — PROGRESS NOTE ADULT - PROBLEM SELECTOR PLAN 5
- S/p radical prostatectomy at [facility]  - appreciate heme/onc recs- hypercoagulable workup outpatient
- S/p radical prostatectomy at [facility]  - appreciate heme/onc recs- hypercoagulable workup outpatient

## 2021-11-26 NOTE — CHART NOTE - NSCHARTNOTEFT_GEN_A_CORE
Spoke to RN, Tamanna Chavez whom works with Dr. Jaylon Raines who preform proctectomy surgery at [facility].  Pt was suppose to see RN today to have Moreno removed s/p procedure.  RN stated moreno can be removed by RN.  Advised pt to increase fluid intact, advised kegal exercises and can follow up routinely as previously schedule.  PT aware as he spoke with RN prior to her calling.  Hospitalist and RN made aware.

## 2021-11-26 NOTE — PROGRESS NOTE ADULT - PROBLEM SELECTOR PROBLEM 3
Deep vein thrombosis (DVT) with pulmonary embolism present on admission
Deep vein thrombosis (DVT) with pulmonary embolism present on admission

## 2021-11-27 VITALS
HEART RATE: 76 BPM | OXYGEN SATURATION: 99 % | DIASTOLIC BLOOD PRESSURE: 73 MMHG | TEMPERATURE: 99 F | SYSTOLIC BLOOD PRESSURE: 124 MMHG | RESPIRATION RATE: 18 BRPM

## 2021-11-27 LAB
HCT VFR BLD CALC: 31.4 % — LOW (ref 39–50)
HGB BLD-MCNC: 10.6 G/DL — LOW (ref 13–17)
MCHC RBC-ENTMCNC: 30.5 PG — SIGNIFICANT CHANGE UP (ref 27–34)
MCHC RBC-ENTMCNC: 33.8 GM/DL — SIGNIFICANT CHANGE UP (ref 32–36)
MCV RBC AUTO: 90.2 FL — SIGNIFICANT CHANGE UP (ref 80–100)
PLATELET # BLD AUTO: 289 K/UL — SIGNIFICANT CHANGE UP (ref 150–400)
RBC # BLD: 3.48 M/UL — LOW (ref 4.2–5.8)
RBC # FLD: 13.2 % — SIGNIFICANT CHANGE UP (ref 10.3–14.5)
WBC # BLD: 9.69 K/UL — SIGNIFICANT CHANGE UP (ref 3.8–10.5)
WBC # FLD AUTO: 9.69 K/UL — SIGNIFICANT CHANGE UP (ref 3.8–10.5)

## 2021-11-27 PROCEDURE — 85027 COMPLETE CBC AUTOMATED: CPT

## 2021-11-27 PROCEDURE — 85652 RBC SED RATE AUTOMATED: CPT

## 2021-11-27 PROCEDURE — U0003: CPT

## 2021-11-27 PROCEDURE — 99239 HOSP IP/OBS DSCHRG MGMT >30: CPT

## 2021-11-27 PROCEDURE — 84484 ASSAY OF TROPONIN QUANT: CPT

## 2021-11-27 PROCEDURE — 80048 BASIC METABOLIC PNL TOTAL CA: CPT

## 2021-11-27 PROCEDURE — 93005 ELECTROCARDIOGRAM TRACING: CPT

## 2021-11-27 PROCEDURE — 85610 PROTHROMBIN TIME: CPT

## 2021-11-27 PROCEDURE — 87070 CULTURE OTHR SPECIMN AEROBIC: CPT

## 2021-11-27 PROCEDURE — 85730 THROMBOPLASTIN TIME PARTIAL: CPT

## 2021-11-27 PROCEDURE — 89051 BODY FLUID CELL COUNT: CPT

## 2021-11-27 PROCEDURE — 83735 ASSAY OF MAGNESIUM: CPT

## 2021-11-27 PROCEDURE — 84100 ASSAY OF PHOSPHORUS: CPT

## 2021-11-27 PROCEDURE — 93306 TTE W/DOPPLER COMPLETE: CPT

## 2021-11-27 PROCEDURE — 86850 RBC ANTIBODY SCREEN: CPT

## 2021-11-27 PROCEDURE — 86769 SARS-COV-2 COVID-19 ANTIBODY: CPT

## 2021-11-27 PROCEDURE — 86901 BLOOD TYPING SEROLOGIC RH(D): CPT

## 2021-11-27 PROCEDURE — 86140 C-REACTIVE PROTEIN: CPT

## 2021-11-27 PROCEDURE — 87205 SMEAR GRAM STAIN: CPT

## 2021-11-27 PROCEDURE — 87075 CULTR BACTERIA EXCEPT BLOOD: CPT

## 2021-11-27 PROCEDURE — 86900 BLOOD TYPING SEROLOGIC ABO: CPT

## 2021-11-27 PROCEDURE — 36415 COLL VENOUS BLD VENIPUNCTURE: CPT

## 2021-11-27 PROCEDURE — 83880 ASSAY OF NATRIURETIC PEPTIDE: CPT

## 2021-11-27 PROCEDURE — 73562 X-RAY EXAM OF KNEE 3: CPT

## 2021-11-27 PROCEDURE — 80053 COMPREHEN METABOLIC PANEL: CPT

## 2021-11-27 PROCEDURE — U0005: CPT

## 2021-11-27 PROCEDURE — 86803 HEPATITIS C AB TEST: CPT

## 2021-11-27 PROCEDURE — 89060 EXAM SYNOVIAL FLUID CRYSTALS: CPT

## 2021-11-27 PROCEDURE — 85025 COMPLETE CBC W/AUTO DIFF WBC: CPT

## 2021-11-27 RX ORDER — OXYCODONE AND ACETAMINOPHEN 5; 325 MG/1; MG/1
1 TABLET ORAL
Qty: 0 | Refills: 0 | DISCHARGE

## 2021-11-27 RX ORDER — AMLODIPINE BESYLATE 2.5 MG/1
1 TABLET ORAL
Qty: 0 | Refills: 0 | DISCHARGE

## 2021-11-27 RX ADMIN — PANTOPRAZOLE SODIUM 40 MILLIGRAM(S): 20 TABLET, DELAYED RELEASE ORAL at 05:25

## 2021-11-27 RX ADMIN — Medication 1 TABLET(S): at 11:15

## 2021-11-27 RX ADMIN — APIXABAN 10 MILLIGRAM(S): 2.5 TABLET, FILM COATED ORAL at 05:25

## 2021-11-27 RX ADMIN — SODIUM CHLORIDE 3 MILLILITER(S): 9 INJECTION INTRAMUSCULAR; INTRAVENOUS; SUBCUTANEOUS at 05:12

## 2021-11-27 RX ADMIN — SODIUM CHLORIDE 3 MILLILITER(S): 9 INJECTION INTRAMUSCULAR; INTRAVENOUS; SUBCUTANEOUS at 13:56

## 2021-11-27 RX ADMIN — Medication 15 MILLIGRAM(S): at 03:00

## 2021-11-27 RX ADMIN — Medication 15 MILLIGRAM(S): at 02:18

## 2021-11-27 NOTE — DISCHARGE NOTE NURSING/CASE MANAGEMENT/SOCIAL WORK - PATIENT PORTAL LINK FT
You can access the FollowMyHealth Patient Portal offered by [facility] by registering at the following website: [email or web address]. By joining [employer]’s FollowMyHealth portal, you will also be able to view your health information using other applications (apps) compatible with our system.

## 2021-11-27 NOTE — DISCHARGE NOTE NURSING/CASE MANAGEMENT/SOCIAL WORK - NSCORESITESY/N_GEN_A_CORE_RD
BP consistently soft. MAP <65. Vitals validated in doc-flowsheet. Patient asymptomatic. Notified MD.  
No response from MD.  
 
Patient complaining of nausea - PRN zofran given. No

## 2021-12-09 LAB
CULTURE RESULTS: SIGNIFICANT CHANGE UP
SPECIMEN SOURCE: SIGNIFICANT CHANGE UP

## 2023-03-31 NOTE — PROGRESS NOTE ADULT - PROBLEM SELECTOR PLAN 6
- DVT ppx- Heparin gtt  - Diet- DASH diet  - Dispo- pending clinical improvement    - Patient deferred updating family members.     Patient is actively acute, no plan for discharge at this time pending clinical course. Patient is at high risk at the moment due to acute b/l PE.
- DVT ppx- Eliquis  - Diet- DASH diet  - Dispo- pending PT evaluation and ambulatory oxygen saturation    - Patient deferred updating family members.   - Discharge likely in 1-2 days
no

## 2023-12-25 NOTE — H&P ADULT - BP NONINVASIVE MEAN (MM [facility])
UA and infectious workup pending at this time   Blood cultures no growth at 24 hours.    No overt signs of infection, concern for lower extremity cellulitis and on IV ancef.   B12 low, started on b12 injections  Folate and vitamin d normal  Rest of plan under stroke like symptoms  Frequent reorientation and delirium precautions   97
